# Patient Record
Sex: FEMALE | Race: WHITE | NOT HISPANIC OR LATINO | Employment: FULL TIME | URBAN - METROPOLITAN AREA
[De-identification: names, ages, dates, MRNs, and addresses within clinical notes are randomized per-mention and may not be internally consistent; named-entity substitution may affect disease eponyms.]

---

## 2024-11-21 ENCOUNTER — HOSPITAL ENCOUNTER (OUTPATIENT)
Dept: RADIOLOGY | Facility: CLINIC | Age: 54
Discharge: HOME | End: 2024-11-21
Payer: COMMERCIAL

## 2024-11-21 DIAGNOSIS — Z12.31 SCREENING MAMMOGRAM FOR BREAST CANCER: ICD-10-CM

## 2024-11-21 PROCEDURE — 77063 BREAST TOMOSYNTHESIS BI: CPT | Performed by: RADIOLOGY

## 2024-11-21 PROCEDURE — 77067 SCR MAMMO BI INCL CAD: CPT | Performed by: RADIOLOGY

## 2024-11-21 PROCEDURE — 77067 SCR MAMMO BI INCL CAD: CPT

## 2025-02-03 ENCOUNTER — OFFICE VISIT (OUTPATIENT)
Dept: ORTHOPEDIC SURGERY | Facility: CLINIC | Age: 55
End: 2025-02-03
Payer: COMMERCIAL

## 2025-02-03 ENCOUNTER — HOSPITAL ENCOUNTER (OUTPATIENT)
Dept: RADIOLOGY | Facility: CLINIC | Age: 55
Discharge: HOME | End: 2025-02-03
Payer: COMMERCIAL

## 2025-02-03 DIAGNOSIS — M75.81 ROTATOR CUFF TENDINITIS, RIGHT: ICD-10-CM

## 2025-02-03 DIAGNOSIS — M25.511 ACUTE PAIN OF RIGHT SHOULDER: ICD-10-CM

## 2025-02-03 PROCEDURE — 73030 X-RAY EXAM OF SHOULDER: CPT | Mod: RIGHT SIDE | Performed by: STUDENT IN AN ORGANIZED HEALTH CARE EDUCATION/TRAINING PROGRAM

## 2025-02-03 PROCEDURE — 99204 OFFICE O/P NEW MOD 45 MIN: CPT | Performed by: STUDENT IN AN ORGANIZED HEALTH CARE EDUCATION/TRAINING PROGRAM

## 2025-02-03 PROCEDURE — 99214 OFFICE O/P EST MOD 30 MIN: CPT | Performed by: STUDENT IN AN ORGANIZED HEALTH CARE EDUCATION/TRAINING PROGRAM

## 2025-02-03 PROCEDURE — 73030 X-RAY EXAM OF SHOULDER: CPT | Mod: RT

## 2025-02-03 RX ORDER — NAPROXEN 500 MG/1
500 TABLET ORAL 2 TIMES DAILY
Qty: 28 TABLET | Refills: 0 | Status: SHIPPED | OUTPATIENT
Start: 2025-02-03 | End: 2025-02-17

## 2025-02-03 NOTE — PROGRESS NOTES
Acute Injury New Patient Visit    HPI: Kris is a 54 y.o.female who presents today with new complaints of right shoulder injury.  She is right-hand dominant.  For approximately 4 months she has been having some right shoulder pain that is anterior.  She denies any falls or injuries.  She denies any neck pain and numbness tingling.  She states that reaching behind her makes it worse.  She does not note any popping, clicking, or locking.  She is been trying IcyHot, Tiger balm, Biofreeze.    Plan: For this right rotator cuff tendinitis, we will start her naproxen, physical therapy.  Additionally, discussed conservative treatment measures including rest, ice, elevation, compression, and over-the-counter analgesia as needed and as appropriate.  Risks of NSAID use, steroid use, and muscle relaxers discussed in depth and considered in light of medical comorbidities.  Patient, and parent/guardian as applicable, understand agree with plan.  Follow-up: 5 to 6 weeks  X-rays on follow-up: None      Assessment:   Problem List Items Addressed This Visit    None  Visit Diagnoses       Acute pain of right shoulder        Relevant Medications    naproxen (Naprosyn) 500 mg tablet    Other Relevant Orders    XR shoulder right 2+ views    Referral to Physical Therapy    Rotator cuff tendinitis, right        Relevant Medications    naproxen (Naprosyn) 500 mg tablet    Other Relevant Orders    Referral to Physical Therapy            Diagnostics: Reviewed all relevant imaging including x-ray, MRI, CT, and US.      Procedure:  Procedures    Physical Exam:  GENERAL:  No obvious acute distress.  NEURO:  Distally neurovascularly intact.  Sensation intact to light touch.  Extremity: Right shoulder exam shows:  Skin is intact;  No erythema or warmth;  No edema or ecchymosis;  No clinical signs of infection;  Can forward flex to 180 degrees;  Abduction to 150 degrees;  External rotation from neutral at 75 degrees;  Internal rotation at the  level of T10;  POSITIVE signs of impingement with Guillen or Neer's test;  Negative empty can test;  Negative crossarm test;  No pain over the AC joint;  Negative Natrona's test;  Negative sulcus sign;  Negative anterior apprehension's test; and  Neurovascularly intact.    Orders Placed This Encounter    XR shoulder right 2+ views    Referral to Physical Therapy    naproxen (Naprosyn) 500 mg tablet      At the conclusion of the visit there were no further questions by the patient/family regarding their plan of care.  Patient was instructed to call or return with any issues, questions, or concerns regarding their injury and/or treatment plan described above.     02/03/25 at 12:28 PM - Perez Wagner,     Office: [phone]    This note was prepared using voice recognition software.  The details of this note are correct and have been reviewed, and corrected to the best of my ability.  Some grammatical errors may persist related to the Dragon software.

## 2025-02-05 ENCOUNTER — EVALUATION (OUTPATIENT)
Dept: PHYSICAL THERAPY | Facility: CLINIC | Age: 55
End: 2025-02-05
Payer: COMMERCIAL

## 2025-02-05 DIAGNOSIS — M25.511 ACUTE PAIN OF RIGHT SHOULDER: Primary | ICD-10-CM

## 2025-02-05 DIAGNOSIS — M75.81 ROTATOR CUFF TENDONITIS, RIGHT: ICD-10-CM

## 2025-02-05 PROCEDURE — 97161 PT EVAL LOW COMPLEX 20 MIN: CPT | Mod: GP | Performed by: PHYSICAL THERAPIST

## 2025-02-05 PROCEDURE — 97110 THERAPEUTIC EXERCISES: CPT | Mod: GP | Performed by: PHYSICAL THERAPIST

## 2025-02-05 ASSESSMENT — PATIENT HEALTH QUESTIONNAIRE - PHQ9
SUM OF ALL RESPONSES TO PHQ9 QUESTIONS 1 AND 2: 0
1. LITTLE INTEREST OR PLEASURE IN DOING THINGS: NOT AT ALL
2. FEELING DOWN, DEPRESSED OR HOPELESS: NOT AT ALL

## 2025-02-05 NOTE — PROGRESS NOTES
Physical Therapy Evaluation and Treatment      Patient Name: Kris Dumont  MRN: [id number]  Today's Date: 2/5/2025  Time Calculation  Start Time: 0736  Stop Time: 0817  Time Calculation (min): 41 min      PT Evaluation Time Entry  PT Evaluation (Low) Time Entry: 25  PT Therapeutic Procedures Time Entry  Therapeutic Exercise Time Entry: 15                   INSURANCE:  Visit number: 1  ALEISHA SNYDER COPAY 0 DED 3200( 7 ) 6400( 7 )   COVERAGE 80 OOP 4000( 7 ) 800( 7 ) AUTH REQ# [id number]  1 VISIT 2-5-25 TO 2-19-25 REF# JUSTIN WILKINS [id number]  [id number]/ALL     Referring Provider: Dr. Perez Wagner    ASSESSMENT:  PT Assessment Results: decreased knowledge of HEP, activity limitations, ADLs/IADLs/self care skills, flexibility, motor function/control/tone, pain, participation restrictions, range of motion/joint mobility, strength, posture, transfers, coordination, decreased knowledge of precautions.  Rehab Prognosis: Good  Evaluation/Treatment Tolerance: Patient tolerated treatment well  Stable and/or uncomplicated characteristics    Kris Dumont is a 54 y.o. female presenting to the clinic with s/s consistent with R shoulder impingement and tendonitis of RTC/biceps. Pt demonstrates decreased ROM and strength of the R shoulder causing pain and dysfunction with reaching, lifting, carrying, pushing, pulling, vacuuming, and dressing. Pt was given and reviewed HEP including postural exercises. Pt will benefit from skilled PT in order to increase ROM and strength of the R shoulder so that the pt can perform ADLs without pain and return to PLOF.     PLAN:  Treatments/Interventions: traction, dry needling, edema control, education/instruction, home program, self care/home management, manual therapy, neuromuscular re-education, therapeutic activities, therapeutic exercises, modalities, therapeutic elastic taping.   PT Plan: Skilled PT  PT Frequency: 2 times per week  Duration: 10 visits  Onset Date: 10/05/24  Number of  Treatments Authorized: Requires Authorization  Rehab Potential: Good  Plan of Care Agreement: Patient    Goals -    STG - After about 6 weeks:  Pt will report less than a 4/10 pain at the worst.  Pt will be able to manage changes in intra-abdominal pressure with appropriate thoracic diaphragm, pelvic, and TA muscle activation during functional activities that cause symptoms.  Pt will increase by 1 MMT grade for the R shoulder in order to aid with completion of ADLs.  Pt will report MDIC with Quick DASH score.  Pt will have AROM by 10 degrees for the R shoulder.     LTG - after about 12 weeks:  Pt will report less than a 0-2/10 pain at the worst.  Pt will have at least 4+/5 to 5/5 strength for the R shoulder in order to aid with completion of ADLs.  Pt will have AROM to WFL for the R shoulder.   Pt will report 50% improvement with Quick DASH score.  Pt will be independent with progressed HEP.    CURRENT PROBLEM:   1. Acute pain of right shoulder  Follow Up In Physical Therapy      2. Rotator cuff tendonitis, right  Follow Up In Physical Therapy          Subjective    General -    Pt reports that her R shoulder pain started about 4 months ago. Pt does not know how it started but reports that she noticed it when she reached out at an angle. Pt states that it would be off and on for a little bit and she was able to manage the pain at first with different creams. Pt reports that about a month ago she started to notice a lot more weakness in the shoulder, especially with reaching to the side.     Mechanism of Injury -    Insidious Onset    History of Current Episode - 4 months    Pain -    Pain Location: R shoulder  Pain Best: 0-1/10  Pain Today: 1/10  Pain Worst: 8/10   Pain Type: Intermittent, Achy/Dull, Sharp, Stiffness/Tightness    Pain Exacerbating Factors: reaching, lifting, carrying, pushing, pulling, vacuuming, and dressing.  Pain Relieving Factors: Rest, Ibuprofen, Tiger Balm, Icy Hot, Hot water  bottle  Difficulty Sleeping: Sometimes, when she rolls  Night Sweats, Loss of Appetite, Unexpected Weight-loss: No  Saddle/Bowel/Bladder: No    Work -   Work Status: Full Time   Office Job    Home Living -    Pt lives with her .    Patient Stated Goals -    Strengthen her arm  Reduce pain    PRECAUTIONS -    None    Prior Level of Function -    I with ADLs/IADLs     Objective     Shoulder AROM -  R shoulder Flexion: 145   R shoulder Abduction: 65   R shoulder ER: 58   R shoulder Functional IR reach: T7   L shoulder Flexion: 173   L shoulder Abduction: 155   L shoulder ER: 68   L shoulder Functional IR reach: T10     Shoulder MMT (/5) -  R shoulder Flexion: 4-   R shoulder Abduction: 4-   R shoulder ER: 4-   R shoulder IR: 4+   L shoulder Flexion: 4+   L shoulder Abduction: 4+   L shoulder ER: 4   L shoulder IR: 4+     Special Tests -   Neer Impingement negative bilateral  Hawkin's Kyle positive right  Mouna negative bilateral  Empty Can negative bilateral  Speed's positive bilateral    Posture -   Elevated Scapula  Rounded Shoulders  Forward Head    Outcome Measures -   Other Measures  Disability of Arm Shoulder Hand (DASH): 34 (% Quick)     Treatment -   Evaluation -   Low (89674)  Therapeutic Exercise (79922) -     Seated Correct Posture: reviewed  Scapular Retractions: x10  Cervical Retractions: x10  UT/Levator Scapulae Stretch: 30 sec ea B  Towel Slides at Table Top (Flexion/Scaption): x10 ea   T-band B ER: GTB x10  T-band Rows: GTB x10    OP Patient Education -   Access Code: [email or web address]  URL: [email or web address]  Date: 02/05/2025  Prepared by: Janet James    Exercises  - Seated Correct Posture   - Seated Scapular Retraction  - 1-2 x daily - 2 sets - 10 reps - 2 seconds hold  - Seated Cervical Retraction  - 1-2 x daily - 2 sets - 10 reps - 2 seconds hold  - Seated Upper Trapezius Stretch  - 1-2 x daily - 2-3 sets - 30 seconds hold  - Gentle Levator Scapulae Stretch  - 1-2 x daily  - 2-3 sets - 30 seconds hold  - Seated Bilateral Shoulder Flexion Towel Slide at Table Top  - 1-2 x daily - 2-3 sets - 10 reps  - Seated Shoulder Towel Slides Scaption at Table  - 1-2 x daily - 2-3 sets - 10 reps  - Shoulder External Rotation and Scapular Retraction with Resistance  - 1-2 x daily - 2-3 sets - 10 reps  - Standing Row with Anchored Resistance  - 1-2 x daily - 2-3 sets - 10 reps

## 2025-02-19 ENCOUNTER — TREATMENT (OUTPATIENT)
Dept: PHYSICAL THERAPY | Facility: CLINIC | Age: 55
End: 2025-02-19
Payer: COMMERCIAL

## 2025-02-19 DIAGNOSIS — M75.81 ROTATOR CUFF TENDONITIS, RIGHT: ICD-10-CM

## 2025-02-19 DIAGNOSIS — M25.511 ACUTE PAIN OF RIGHT SHOULDER: ICD-10-CM

## 2025-02-19 PROCEDURE — 97110 THERAPEUTIC EXERCISES: CPT | Mod: GP,CQ

## 2025-02-19 ASSESSMENT — PAIN SCALES - GENERAL: PAINLEVEL_OUTOF10: 2

## 2025-02-19 ASSESSMENT — PAIN - FUNCTIONAL ASSESSMENT: PAIN_FUNCTIONAL_ASSESSMENT: 0-10

## 2025-02-19 NOTE — PROGRESS NOTES
"Physical Therapy Treatment    Patient Name: Kris Dumont  MRN: [id number]  Today's Date: 2/19/2025  Time Calculation  Start Time: 0747  Stop Time: 0830  Time Calculation (min): 43 min    Insurance  Visit number: Abdirizak SNYDER COPAY 0 DED 3200( 7 ) 6400( 7 )   COVERAGE 80 OOP 4000( 7 ) 800( 7 ) AUTH REQ# [id number]  1 VISIT 2-5-25 TO 2-19-25 REF# JUSTIN WILKINS [id number]  [id number]/ALL      Referring Provider: Dr. Perez Wagner     Current Problem  1. Acute pain of right shoulder  Follow Up In Physical Therapy      2. Rotator cuff tendonitis, right  Follow Up In Physical Therapy          Subjective    General   Pt states she \"was okay\" following initial visit.  She has been using the band with HEP activities & does get some increased soreness following the activities, but otherwise doing well with HEP.  Reports performing 2x/day.  Overall, pt states is still \"pretty much the same\" and \"I try not to baby it.\"  Reports having the most difficulty with reaching movements.     Precautions  Precautions  Precautions Comment: No recent falls reported    Pain  Pain Assessment  Pain Assessment: 0-10  0-10 (Numeric) Pain Score: 2  Pain Location: Shoulder  Pain Orientation: Right  Pain Radiating Towards: down into elbow with reaching movements  Pain Descriptors: Nagging, Dull, Throbbing  Pain Frequency: Intermittent  Clinical Progression: Not changed  Effect of Pain on Daily Activities: Reaching, Lifting, Carrying, Sleep    Objective   Pt denies any numbness, tingling    Treatments:  Therapeutic Exercises (16862): 41 Minutes, 3 Units    Activities:  Seated Correct Posture: reviewed  Scapular Retractions: 5\" x15  Cervical Retractions: 5\" x15  UT/Levator Scapulae Stretch: 30 sec x2 ea LUC  Towel Slides at Table Top (Flexion/Scaption): x10 ea   T-band B ER: GTB x10  T-band Rows: GTB x10  T-band LAE w/scap retraction: GTB x10       Assessment:   Reviewed activities initiated first visit, providing vc's as needed for postural " "improvements & overall form/technique.  She exhibits good recall of the initial activities, suggesting good compliance so far with HEP.  Good follow through observed to any cues provided.  Progressed table slide to wall this visit with good tolerance.  Also added wall ABC's to improve overall mobility & capacity for reaching into various directions.  TB LAE w/scap retraction also added to the activities performed for further strengthening.  Reports shoulder \"feels better\" with rehab activities performed.  Normal muscle soreness, fatigue reported post activity.  Anticipate she will be able to continue with POC & progressions as tolerated.    Plan:   Continue to progress with rehab POC as tolerated to increase ROM/mobility & GH/scapular strength, stability for improving overall capacity to perform daily activities.     "

## 2025-02-21 ENCOUNTER — TREATMENT (OUTPATIENT)
Dept: PHYSICAL THERAPY | Facility: CLINIC | Age: 55
End: 2025-02-21
Payer: COMMERCIAL

## 2025-02-21 DIAGNOSIS — M25.511 ACUTE PAIN OF RIGHT SHOULDER: ICD-10-CM

## 2025-02-21 DIAGNOSIS — M75.81 ROTATOR CUFF TENDONITIS, RIGHT: ICD-10-CM

## 2025-02-21 PROCEDURE — 97110 THERAPEUTIC EXERCISES: CPT | Mod: GP | Performed by: PHYSICAL THERAPIST

## 2025-02-21 NOTE — PROGRESS NOTES
Physical Therapy Treatment    Patient Name: Kris Dumont  MRN: [id number]  Today's Date: 2/21/2025  Time Calculation  Start Time: 0737  Stop Time: 0816  Time Calculation (min): 39 min       PT Therapeutic Procedures Time Entry  Therapeutic Exercise Time Entry: 38                   INSURANCE:  Visit number: 3/6  ANTHEM APPROVED 5 PT VISITS  2-10-25 to 4-10-25  AUTH# [id number]  [id number]/ALL  ANTHEM BOA COPAY 0 DED 3200( 7 ) 6400( 7 )   COVERAGE 80 OOP 4000( 7 ) 800( 7 ) AUTH REQ# [id number]  1 VISIT 2-5-25 TO 2-19-25 REF# JUSTIN WILKINS [id number]  [id number]/ALL     Referring Provider: Dr. Perez Wagner     CURRENT PROBLEM:  1. Acute pain of right shoulder  Follow Up In Physical Therapy      2. Rotator cuff tendonitis, right  Follow Up In Physical Therapy        SUBJECTIVE:   General -   Pt is doing home exercises.    Pain -    Pain Location/Description: R shoulder  Pain Today: 0/10  Pain Worst: 10/10 reaching behind her    Precautions -    None    OBJECTIVE:   Treatment -   Therapeutic Exercise (26499) -  Chin Tucks: x15 (supine)  Side-lying Shoulder 4-way: 2x10  Supine SA Punches: 2x10  Towel Slides at Wall (Flexion/Scaption): x10 ea   T-band Rows/LAE w/scap retraction: GTB 2x10  T-band B ER at wall: GTB 2x10  SA Slides: 2x10  Wall Push Ups: 2x10  T-band IR/ER: GTB 2x10 ea  MB Series (CP, Bus, 8s): 1.1# 2x10 ea  Wall Alphabet: --    OP Patient Education -   Access Code: [email or web address]  URL: [email or web address]  Date: 02/21/2025  Prepared by: Janet James  Added Exercises:  - Sidelying Shoulder External Rotation with Dumbbell  - 1-2 x daily - 2-3 sets - 10 reps  - Sidelying Shoulder Abduction Palm Forward  - 1-2 x daily - 2-3 sets - 10 reps  - Sidelying Shoulder Flexion 15 Degrees  - 1-2 x daily - 2-3 sets - 10 reps  - Sidelying Shoulder Horizontal Abduction  - 1-2 x daily - 2-3 sets - 10 reps    ASSESSMENT:     Pt was introduced to SA activation exercises which she responded well. Added side-lying  4-way for improved GH joint stability; cueing provided to prevent scapular hiking. Pt was introduced to wall pushups for improved closed chained strength. Added MB series for improved addition of rotational strength and combined motions. Pt tolerated session well and is progressing towards functional needs. Continue to progress pt within tolerance and POC.    PLAN:    Continue to progress pt within tolerance. Continue to work on GH stability with scapular posture.

## 2025-02-25 ENCOUNTER — TREATMENT (OUTPATIENT)
Dept: PHYSICAL THERAPY | Facility: CLINIC | Age: 55
End: 2025-02-25
Payer: COMMERCIAL

## 2025-02-25 DIAGNOSIS — M75.81 ROTATOR CUFF TENDONITIS, RIGHT: ICD-10-CM

## 2025-02-25 DIAGNOSIS — M25.511 ACUTE PAIN OF RIGHT SHOULDER: ICD-10-CM

## 2025-02-25 PROCEDURE — 97110 THERAPEUTIC EXERCISES: CPT | Mod: GP

## 2025-02-25 NOTE — PROGRESS NOTES
Physical Therapy Treatment    Patient Name: Kris Dumont  MRN: [id number]  Today's Date: 2/25/2025  Time Calculation  Start Time: 0746  Stop Time: 0825  Time Calculation (min): 39 min       PT Therapeutic Procedures Time Entry  Therapeutic Exercise Time Entry: 39                   INSURANCE:  Visit number: 4/6  ANTHEM APPROVED 5 PT VISITS  2-10-25 to 4-10-25  AUTH# [id number]  [id number]/ALL  ANTHEM BOA COPAY 0 DED 3200( 7 ) 6400( 7 )   COVERAGE 80 OOP 4000( 7 ) 800( 7 ) AUTH REQ# [id number]  1 VISIT 2-5-25 TO 2-19-25 REF# JUSTIN WILKINS [id number]  [id number]/ALL     Referring Provider: Dr. Perez Wagner     CURRENT PROBLEM:  1. Acute pain of right shoulder  Follow Up In Physical Therapy      2. Rotator cuff tendonitis, right  Follow Up In Physical Therapy        SUBJECTIVE:   General -   Pt states her shoulder had some pain this morning after sleeping on it but the pain has since subsided. She reports compliance with HEP.     Pain -    Pain Location/Description: R shoulder  Pain Today: 0/10  Pain Worst: 10/10 reaching behind her    Precautions -    None    OBJECTIVE:   Treatment -   Therapeutic Exercise (69990) -  Chin Tucks: x15 (supine)  Side-lying Shoulder 4-way: 2x10  Supine SA Punches: 2x10  Towel Slides at Wall (Flexion/Scaption): x10 ea   T-band Rows/LAE w/scap retraction: GTB 2x10  T-band IR/ER: GTB 2x10 ea  T-band B ER at wall: GTB 2x10  SA w/ RTB Slides: 2x10  Wall Push Ups: 2x10  MB Series (CP, Bus, 8s): 1.1# 2x10 ea  Wall Alphabet: --    OP Patient Education -   Access Code: [email or web address]  URL: [email or web address]  Date: 02/25/2025  Prepared by: Caterina Aleman    Exercises  - Standing Shoulder Row with Anchored Resistance  - 1 x daily - 4 x weekly - 2 sets - 10 reps  - Shoulder extension with resistance - Neutral  - 1 x daily - 4 x weekly - 2 sets - 10 reps  - Shoulder External Rotation and Scapular Retraction with Resistance  - 1 x daily - 4 x weekly - 2 sets - 10 reps    ASSESSMENT:   Pt  tolerated treatment session well today. Added RTB for ER engagement during SA wall slides to good tolerance. Pt noted increased difficulty with this exercise. Attempted standing banded shoulder Horizontal abduction but pt experienced increased biceps tendon pain, so exercise was stopped after first few repetitions. Educated and provided updated pt HEP. Pt continues to require skilled PT to increase strength and ROM to return to PLOF.     PLAN:    Continue to progress as tolerated to increase strength and ROM

## 2025-02-28 ENCOUNTER — TREATMENT (OUTPATIENT)
Dept: PHYSICAL THERAPY | Facility: CLINIC | Age: 55
End: 2025-02-28
Payer: COMMERCIAL

## 2025-02-28 DIAGNOSIS — M75.81 ROTATOR CUFF TENDONITIS, RIGHT: ICD-10-CM

## 2025-02-28 DIAGNOSIS — M25.511 ACUTE PAIN OF RIGHT SHOULDER: ICD-10-CM

## 2025-02-28 PROCEDURE — 97110 THERAPEUTIC EXERCISES: CPT | Mod: GP | Performed by: PHYSICAL THERAPIST

## 2025-02-28 NOTE — PROGRESS NOTES
Physical Therapy Treatment    Patient Name: Kris Dumont  MRN: [id number]  Today's Date: 2/28/2025  Time Calculation  Start Time: 1031  Stop Time: 1111  Time Calculation (min): 40 min       PT Therapeutic Procedures Time Entry  Therapeutic Exercise Time Entry: 38                   INSURANCE:  Visit number: 5/6  ANTHEM APPROVED 5 PT VISITS  2-10-25 to 4-10-25  AUTH# [id number]  [id number]/ALL  ANTHEM BOA COPAY 0 DED 3200( 7 ) 6400( 7 )   COVERAGE 80 OOP 4000( 7 ) 800( 7 ) AUTH REQ# [id number]  1 VISIT 2-5-25 TO 2-19-25 REF# JUSTIN WILKINS [id number]  [id number]/ALL      Referring Provider: Dr. Perez Wagner     CURRENT PROBLEM:  1. Acute pain of right shoulder  Follow Up In Physical Therapy      2. Rotator cuff tendonitis, right  Follow Up In Physical Therapy        SUBJECTIVE:   General -   Pt is doing home exercises.  Tightness soreness     Pain -    Pain Location/Description: R shoulder  Pain Today: 0-1/10    Precautions -    None    OBJECTIVE:     Treatment -   Therapeutic Exercise (90534) -  Chin Tucks: x15 (supine)  Side-lying Shoulder 4-way: 1# 2x10  Supine SA Punches: 2x10  T-band Rows/LAE w/scap retraction: BTB 2x10  T-band IR/ER: BTB 2x10 ea  SA w/ RTB Slides: 2x10  SA walkout: RTB 2x10  Wall Push Ups Plus: 2x10  Ball on Wall: 1.1# x20 ea  MB Series (CP, Bus, 8s): 1.1# 2x10 ea  Towel Slides at Wall (Flexion/Scaption): x20 ea   JOBEs at wall: 2x10  T-band B ER at wall: GTB 2x15  Wall Alphabet: --    ASSESSMENT:     Pt was able to progress resistance for side-lying 4-way and t-band exercises with good tolerance. Added SA push to wall push ups for improved scapular control. Pt was introduced to ball on wall and JOBEs on wall for improved GH stability with cueing for posture. Pt tolerated session well and is progressing towards functional needs. Continue to progress pt within tolerance and POC.    PLAN:    Continue to progress pt within tolerance. Plan to re-assess pt next visit.

## 2025-03-04 ENCOUNTER — TREATMENT (OUTPATIENT)
Dept: PHYSICAL THERAPY | Facility: CLINIC | Age: 55
End: 2025-03-04
Payer: COMMERCIAL

## 2025-03-04 DIAGNOSIS — M75.81 ROTATOR CUFF TENDONITIS, RIGHT: ICD-10-CM

## 2025-03-04 DIAGNOSIS — M25.511 ACUTE PAIN OF RIGHT SHOULDER: Primary | ICD-10-CM

## 2025-03-04 PROCEDURE — 97110 THERAPEUTIC EXERCISES: CPT | Mod: GP | Performed by: PHYSICAL THERAPIST

## 2025-03-04 PROCEDURE — 97032 APPL MODALITY 1+ESTIM EA 15: CPT | Mod: GP | Performed by: PHYSICAL THERAPIST

## 2025-03-04 PROCEDURE — 97140 MANUAL THERAPY 1/> REGIONS: CPT | Mod: GP | Performed by: PHYSICAL THERAPIST

## 2025-03-04 NOTE — PROGRESS NOTES
Physical Therapy Treatment    Patient Name: Kris Dumont  MRN: [id number]  Today's Date: 3/4/2025  Time Calculation  Start Time: 0736  Stop Time: 0817  Time Calculation (min): 41 min       PT Therapeutic Procedures Time Entry  Manual Therapy Time Entry: 15  Therapeutic Exercise Time Entry: 15  PT Modalities Time Entry  E-Stim (Attended) Time Entry: 8                INSURANCE:  Visit number: 5/6  ANTHEM APPROVED 5 PT VISITS  2-10-25 to 4-10-25  AUTH# [id number]  [id number]/ALL  ANTHEM BOA COPAY 0 DED 3200( 7 ) 6400( 7 )   COVERAGE 80 OOP 4000( 7 ) 800( 7 ) AUTH REQ# [id number]  1 VISIT 2-5-25 TO 2-19-25 REF# JUSTIN WILKINS [id number]  [id number]/ALL     Referring Provider: Dr. Perez Wagner     CURRENT PROBLEM:  1. Acute pain of right shoulder        2. Rotator cuff tendonitis, right          SUBJECTIVE:   General -   Pt is doing home exercises.  Pt states that she was improving, but this past week she has been having more pain.    Pain -    Pain Location/Description: R shoulder  Pain Best: 1/10  Pain Today: 4/10  Pain Worst: 4/10     Precautions -    None    OBJECTIVE:     Shoulder AROM -  R shoulder Flexion: 140   R shoulder Abduction: 75  R shoulder ER: 60  R shoulder Functional IR reach: T10     Shoulder MMT (/5) -  R shoulder Flexion: 4- with pain  R shoulder Abduction: 4- with pain  R shoulder ER: 4  R shoulder IR: 4+      Special Tests -   Neer Impingement negative bilateral  Hawkin's Kyle positive right  Mouna negative bilateral  Empty Can negative bilateral  Speed's positive bilateral     Posture -   Elevated Scapula  Rounded Shoulders  Forward Head    Outcome Measures -   Other Measures  Disability of Arm Shoulder Hand (DASH): 18 (% Quick)    Verbal Informed Consent Given for Integrative Dry Needling -   Suprascapular Homeostatic Neuro-trigger Point (1, 50 mm) R  Deltoid Insertion Symptomatic Neuro-trigger Points (1 ea, 50 mm) R  Spinal Accessory Homeostatic Neuro-trigger Point (1, 30 mm) ea B  Dorsal Scapular  Homeostatic Neuro-trigger Point (1, 30 mm) ea B  Lateral Pectoral Homeostatic Neuro-trigger Point (1, 30 mm) R  ENS applied between R Suprascapular/deltoid points for 8 min for improved blood flow and reduced pain/spasm.      Treatment -   Therapeutic Exercise (82524) -  Assessment  Wall Slides (Flexion/Scaption): 2x10 ea  Doorway Pectoral Stretch: 30 sec x2  Manual Therapy (47864) -    STM to posterior and anterior shoulder musculature    ASSESSMENT:     Informed consent for dry needling obtained verbally from patient and reviewed precautions/contraindications. Dry needling performed this date to homeostatic neuro-trigger points and symptomatic neuro-trigger points documented. Clean field assessed before insertion of needles using universal precautions. 30-50 mm needles inserted deep with basic/rotation needle manipulation. Electric stimulation applied for further management of soft tissue dysfunction for 8 minutes. All needles removed, area inspected for adverse symptoms with none noted. Patient educated in post- dry needling management and expected symptoms from treatment. All patient questions answered.   Pt tolerated session well and is progressing towards functional needs. Continue to progress pt within tolerance and POC.    Pt is progressing towards her goals and demonstrates small improvements with her R shoulder for both ROM and strength at this time. Pt had a slight set back this past week, but had been improving previously. Pt reports an increased ability to perform ADLs, but still exhibits deficits as shown by her Quick DASH. Pt will benefit from continued skilled PT in order to further improve ROM and strength of the R shoulder so that the pt can perform ADLs without pain and return to PLOF. Pt will be put on hold for authorization.    PLAN:  Treatments/Interventions: traction, dry needling, edema control, education/instruction, home program, self care/home management, manual therapy, neuromuscular  re-education, therapeutic activities, therapeutic exercises, modalities, therapeutic elastic taping.   PT Plan: Skilled PT  PT Frequency: 1 time per week  Duration: 6 visits  Number of Treatments Authorized: Requires Further Authorization  Rehab Potential: Good  Plan of Care Agreement: Patient  Hold for authorization.    Goals -    STG - After about 6 weeks:  Pt will report less than a 4/10 pain at the worst. (Goal PROGRESSING)    Pt will increase by 1 MMT grade for the R shoulder in order to aid with completion of ADLs. (Goal PROGRESSING)    Pt will report MDIC with Quick DASH score. (Goal PROGRESSING)    Pt will have AROM by 10 degrees for the R shoulder. (Goal Not Met)       LTG - after about 12 weeks:  Pt will report less than a 0-2/10 pain at the worst.  Pt will have at least 4+/5 to 5/5 strength for the R shoulder in order to aid with completion of ADLs.  Pt will have AROM to WFL for the R shoulder.   Pt will report 50% improvement with Quick DASH score.  Pt will be independent with progressed HEP.

## 2025-03-13 ENCOUNTER — OFFICE VISIT (OUTPATIENT)
Dept: ORTHOPEDIC SURGERY | Facility: CLINIC | Age: 55
End: 2025-03-13
Payer: COMMERCIAL

## 2025-03-13 DIAGNOSIS — M75.81 ROTATOR CUFF TENDINITIS, RIGHT: ICD-10-CM

## 2025-03-13 DIAGNOSIS — M75.01 ADHESIVE CAPSULITIS OF RIGHT SHOULDER: ICD-10-CM

## 2025-03-13 PROCEDURE — 99213 OFFICE O/P EST LOW 20 MIN: CPT | Performed by: STUDENT IN AN ORGANIZED HEALTH CARE EDUCATION/TRAINING PROGRAM

## 2025-03-13 NOTE — PROGRESS NOTES
Acute Injury New Patient Visit    HPI: Kris is a 54 y.o.female who presents today for follow-up of her right rotator cuff tendinitis.  States that she is better overall.  She had done physical therapy.  She states that she is feeling a stiffness.  She is having difficulty reaching behind.  She tried dry needling, which helped with some anterior shoulder pain, thought to be biceps tendinitis.  She has been doing her home exercises.  Reaching behind her still hurts.  Heat seems to help.  Denies any interval falls or injuries    On 2/3/2025, she presented with new complaints of right shoulder injury.  She is right-hand dominant.  For approximately 4 months she has been having some right shoulder pain that is anterior.  She denies any falls or injuries.  She denies any neck pain and numbness tingling.  She states that reaching behind her makes it worse.  She does not note any popping, clicking, or locking.  She is been trying IcyHot, Tiger balm, Biofreeze.    Plan: Today, on 3/13/2025, for this right rotator cuff tendinitis and possible developing right adhesive capsulitis, will continue physical therapy, home exercises, and obtain an MRI of the right shoulder for further characterization of what may be a partial rotator cuff tear due to her loss of range of motion in abduction and forward flexion.  Would consider a corticosteroid injection at follow-up, but decide between glenohumeral versus subacromial if we decide this is more related to rotator cuff versus adhesive capsulitis.    On 2/3/2025, for this right rotator cuff tendinitis, we will start her naproxen, physical therapy.  Additionally, discussed conservative treatment measures including rest, ice, elevation, compression, and over-the-counter analgesia as needed and as appropriate.  Risks of NSAID use, steroid use, and muscle relaxers discussed in depth and considered in light of medical comorbidities.  Patient, and parent/guardian as applicable, understand  agree with plan.  Follow-up: With me with results of the MRI  X-rays on follow-up: None      Assessment:   Problem List Items Addressed This Visit    None  Visit Diagnoses       Rotator cuff tendinitis, right        Relevant Orders    MR shoulder right wo IV contrast    Referral to Physical Therapy    Adhesive capsulitis of right shoulder        Relevant Orders    MR shoulder right wo IV contrast    Referral to Physical Therapy              Diagnostics: Reviewed all relevant imaging including x-ray, MRI, CT, and US.      Procedure:  Procedures    Physical Exam:  GENERAL:  No obvious acute distress.  NEURO:  Distally neurovascularly intact.  Sensation intact to light touch.  Extremity: Right shoulder exam shows:  Skin is intact;  No erythema or warmth;  No edema or ecchymosis;  No clinical signs of infection;  Can forward flex to 110 degrees;  Abduction to 90 degrees;  External rotation from neutral at 75 degrees;  Internal rotation at the level of T10;  POSITIVE signs of impingement with Guillen or Neer's test;  Negative empty can test;  Negative crossarm test;  No pain over the AC joint;  Negative [place]'s test;  Negative sulcus sign;  Negative anterior apprehension's test; and  Neurovascularly intact.    Orders Placed This Encounter    MR shoulder right wo IV contrast    Referral to Physical Therapy      At the conclusion of the visit there were no further questions by the patient/family regarding their plan of care.  Patient was instructed to call or return with any issues, questions, or concerns regarding their injury and/or treatment plan described above.     03/13/25 at 6:16 PM - Perez Wagner DO    Office: [phone]    This note was prepared using voice recognition software.  The details of this note are correct and have been reviewed, and corrected to the best of my ability.  Some grammatical errors may persist related to the Dragon software.

## 2025-03-14 ENCOUNTER — TELEPHONE (OUTPATIENT)
Dept: PHYSICAL THERAPY | Facility: CLINIC | Age: 55
End: 2025-03-14

## 2025-03-24 NOTE — PROGRESS NOTES
Physical Therapy Treatment    Patient Name: Kris Dumont  MRN: [id number]  Today's Date: 3/25/2025  Time Calculation  Start Time: 0507  Stop Time: 0548  Time Calculation (min): 41 min     PT Therapeutic Procedures Time Entry  Manual Therapy Time Entry: 13  Therapeutic Exercise Time Entry: 26                 Current Problem  1. Acute pain of right shoulder  Follow Up In Physical Therapy      2. Rotator cuff tendonitis, right  Follow Up In Physical Therapy          Insurance:  Visit number: 6/12  ANTHEM APPROVED 5 PT VISITS  2-10-25 to 4-10-25  AUTH# [id number]  [id number]  ANTHEM BOA COPAY 0 DED 3200( 7 ) 6400( 7 )   COVERAGE 80 OOP 4000( 7 ) 800( 7 ) AUTH REQ# [id number]  1 VISIT 2-5-25 TO 2-19-25 REF# JUSTIN WILKINS [id number]  [id number]/ALL  Precautions   None     Subjective   Subjective:   Pt reports that her pain is more in the shoulder vs the upper arm, as it used to be. She still can't reach behind the back. She had a lot of pain when trying to put something on a shelf. She saw MD recently who thinks she may have a partial tear, thus MRI scheduled for April. Uncomfortable pain is always there. The dry needling seemed to help loosen her a little, but then it returned. Pt hasn't done many exercises since last therapy session  Pain   Dull throb at rest and can go to 10/10    Objective   Treatments:  Therapeutic Exercise (36467) -    Chin Tucks: x15 (supine)   Side-lying Shoulder 4-way: 1# 2x10----  Supine SA Punches: 2x10  Supine alphabet 1x  T-band Rows/LAE w/scap retraction: BTB 2x10  T-band IR/ER: BTB 2x10 ea  SA w/ RTB Slides: 2x10 (no band today)  SA walkout: RTB 2x10--  Wall Push Ups Plus: 2x10---  Ball on Wall: 1.1# x20 ea---  MB Series (CP, Bus, 8s): 1.1# 2x10 ea---  Towel Slides at Wall (Flexion/Scaption): x20 ea ---  JOBEs at wall: 2x10---  T-band B ER at wall: GTB 2x15---  Wall Alphabet: --  Manual Therapy (03910) -    STM to posterior and anterior shoulder musculature, PROM  OP EDUCATION:   Keep doing  gentle ROM ex and strengthening that doesn't cause increased shoulder pain.      Assessment:   Pt had some point tenderness in the anterior shoulder. Tolerated PROM and STM fairly well. Able to do supine exercises without a lot of pain. Pt fatigued with TB ER/IR. Good form with rows and ext. Able to do SA slides, but wall slides in scaption were painful and difficult.     Plan:   Cont to improve shoulder stability.

## 2025-03-25 ENCOUNTER — TREATMENT (OUTPATIENT)
Dept: PHYSICAL THERAPY | Facility: CLINIC | Age: 55
End: 2025-03-25
Payer: COMMERCIAL

## 2025-03-25 DIAGNOSIS — M75.81 ROTATOR CUFF TENDONITIS, RIGHT: ICD-10-CM

## 2025-03-25 DIAGNOSIS — M25.511 ACUTE PAIN OF RIGHT SHOULDER: Primary | ICD-10-CM

## 2025-03-25 PROCEDURE — 97110 THERAPEUTIC EXERCISES: CPT | Mod: GP,CQ

## 2025-03-25 PROCEDURE — 97140 MANUAL THERAPY 1/> REGIONS: CPT | Mod: GP,CQ

## 2025-04-08 ENCOUNTER — TREATMENT (OUTPATIENT)
Dept: PHYSICAL THERAPY | Facility: CLINIC | Age: 55
End: 2025-04-08
Payer: COMMERCIAL

## 2025-04-08 DIAGNOSIS — M75.81 ROTATOR CUFF TENDONITIS, RIGHT: ICD-10-CM

## 2025-04-08 DIAGNOSIS — M25.511 ACUTE PAIN OF RIGHT SHOULDER: ICD-10-CM

## 2025-04-08 PROCEDURE — 97140 MANUAL THERAPY 1/> REGIONS: CPT | Mod: GP | Performed by: PHYSICAL THERAPIST

## 2025-04-08 PROCEDURE — 97110 THERAPEUTIC EXERCISES: CPT | Mod: GP | Performed by: PHYSICAL THERAPIST

## 2025-04-08 NOTE — PROGRESS NOTES
Physical Therapy Treatment    Patient Name: Kris Dumont  MRN: [id number]  Today's Date: 4/8/2025  Time Calculation  Start Time: 0737  Stop Time: 0817  Time Calculation (min): 40 min       PT Therapeutic Procedures Time Entry  Manual Therapy Time Entry: 23  Therapeutic Exercise Time Entry: 15                   INSURANCE:  Visit number: 7/12  ANTHEM APPROVED 5 PT VISITS  2-10-25 to 4-10-25  AUTH# [id number]  [id number]/ALL  ALEISHA BOA COPAY 0 DED 3200( 7 ) 6400( 7 )   COVERAGE 80 OOP 4000( 7 ) 800( 7 ) AUTH REQ# [id number]  1 VISIT 2-5-25 TO 2-19-25 REF# JUSTIN WILKINS [id number]  [id number]/ALL    CURRENT PROBLEM:  1. Acute pain of right shoulder  Follow Up In Physical Therapy      2. Rotator cuff tendonitis, right  Follow Up In Physical Therapy        SUBJECTIVE:   General -   Pt is doing home exercises.  R shoulder is stiffening up still  Stretching from last therapist really helped    Pain -    Pain Location/Description: R shoulder  Pain Today: 2/10    Precautions -    None    OBJECTIVE:     Treatment -   Therapeutic Exercise (53371) -  Side Sleeper Stretch: 10 sec x10  ER stretch at Table: 10 sec x10  T-band Rows/LAE w/scap retraction: GTB 2x10    Manual Therapy (24447) -    STM to posterior and anterior shoulder musculature  PROM   Inferior and PA joint mobilizations    OP Patient Education -   Access Code: [email or web address]  URL: [email or web address]  Date: 04/08/2025  Prepared by: Janet James  Added Exercises  - Sleeper Stretch  - 1-2 x daily - 10 reps - 10 sec hold  - Seated Shoulder External Rotation PROM on Table  - 1-2 x daily - 10 reps - 10 sec hold    HEP2Go  - Inferior self mobs in flexion on wall and abduction at table/countertop  - Inferior Glide/Distraction in chair    ASSESSMENT:     STM and joint mobs performed with PROM this visit for reduced pain and improved ROM. Pt was introduced to more capsular stretching at this time. Pt was given an updated HEP for home with new stretches. Pt  tolerated session well and is progressing towards functional needs. Continue to progress pt within tolerance and POC.    PLAN:    Continue to progress pt within tolerance. Continue with stretching of the R shoulder.

## 2025-04-10 ENCOUNTER — APPOINTMENT (OUTPATIENT)
Dept: RADIOLOGY | Facility: HOSPITAL | Age: 55
End: 2025-04-10
Payer: COMMERCIAL

## 2025-04-15 ENCOUNTER — TREATMENT (OUTPATIENT)
Dept: PHYSICAL THERAPY | Facility: CLINIC | Age: 55
End: 2025-04-15
Payer: COMMERCIAL

## 2025-04-15 DIAGNOSIS — M25.511 ACUTE PAIN OF RIGHT SHOULDER: ICD-10-CM

## 2025-04-15 DIAGNOSIS — M75.81 ROTATOR CUFF TENDONITIS, RIGHT: ICD-10-CM

## 2025-04-15 PROCEDURE — 97140 MANUAL THERAPY 1/> REGIONS: CPT | Mod: GP | Performed by: PHYSICAL THERAPIST

## 2025-04-15 NOTE — PROGRESS NOTES
Physical Therapy Treatment    Patient Name: Kris Dumont  MRN: [id number]  Today's Date: 4/15/2025  Time Calculation  Start Time: 0738  Stop Time: 0803  Time Calculation (min): 25 min       PT Therapeutic Procedures Time Entry  Manual Therapy Time Entry: 23                   INSURANCE:  Visit number: 8/12  ANTHEM APPROVED 5 PT VISITS  2-10-25 to 4-10-25  AUTH# [id number]  [id number]/ALL  ANTHEM BOA COPAY 0 DED 3200( 7 ) 6400( 7 )   COVERAGE 80 OOP 4000( 7 ) 800( 7 ) AUTH REQ# [id number]  1 VISIT 2-5-25 TO 2-19-25 REF# JUSTIN WILKINS [id number]  [id number]/ALL    CURRENT PROBLEM:  1. Acute pain of right shoulder  Follow Up In Physical Therapy      2. Rotator cuff tendonitis, right  Follow Up In Physical Therapy        SUBJECTIVE:   General -   Pt is doing home exercises.  Pt had an MRI which showed adhesive capsulitis, partial tear of supraspinatus, and SLAP tear into the long head of the biceps.     Pain -    Pain Location/Description: R shoulder  Pain Today: 2/10    Precautions -    None    OBJECTIVE:     Shoulder AROM -  R shoulder Flexion: 90   R shoulder Abduction: 75   R shoulder ER: 20   R shoulder Functional IR reach: R iliac crest     Shoulder PROM -  R shoulder Flexion: 108 with empty end feel   R shoulder Abduction: 80 with empty end feel   R shoulder ER: 35 with empty end feel   R shoulder IR: 55 with empty end feel     Shoulder MMT (/5) -  DNT due to pain      Treatment -   Therapeutic Exercise (83526) -  Assessment  Manual Therapy (32970) -    STM to R shoulder biceps, pectorals, teres, infraspinatus, subscapularis, UT and levator    ASSESSMENT:     Pt is progressing towards her goals and demonstrates improvements with both ROM and strength at this time. Pt reports an increased ability to perform ADLs, but still exhibits deficits. Pt will be sent back to the doctor at this time due to MRI results and further assessment. Pt will be put on hold for 30 days.    PLAN:  PT Plan: No Additional PT interventions  required at this time  Duration: Hold for 30 days for MRI follow up with physician.  Rehab Potential: Fair  Plan of Care Agreement: Patient  Hold for 30 days.    Goals -    STG - After about 6 weeks:  Pt will report less than a 4/10 pain at the worst. (Goal Regressing)  Pt will increase by 1 MMT grade for the R shoulder in order to aid with completion of ADLs. (DNT)  Pt will report MDIC with Quick DASH score. (DNT)  Pt will have AROM by 10 degrees for the R shoulder. (Goal Regressing)     LTG - after about 12 weeks:  Pt will report less than a 0-2/10 pain at the worst. (Goal Regressing)  Pt will have at least 4+/5 to 5/5 strength for the R shoulder in order to aid with completion of ADLs. (DNT)  Pt will have AROM to WFL for the R shoulder. (Goal Regressing)  Pt will report 50% improvement with Quick DASH score. (DNT)  Pt will be independent with progressed HEP. (Goal PROGRESSING)

## 2025-04-22 ENCOUNTER — APPOINTMENT (OUTPATIENT)
Dept: PHYSICAL THERAPY | Facility: CLINIC | Age: 55
End: 2025-04-22
Payer: COMMERCIAL

## 2025-04-24 ENCOUNTER — OFFICE VISIT (OUTPATIENT)
Dept: ORTHOPEDIC SURGERY | Facility: CLINIC | Age: 55
End: 2025-04-24
Payer: COMMERCIAL

## 2025-04-24 DIAGNOSIS — M75.01 ADHESIVE CAPSULITIS OF RIGHT SHOULDER: ICD-10-CM

## 2025-04-24 DIAGNOSIS — M75.81 ROTATOR CUFF TENDINITIS, RIGHT: Primary | ICD-10-CM

## 2025-04-24 DIAGNOSIS — S43.431A SUPERIOR LABRUM ANTERIOR-TO-POSTERIOR (SLAP) TEAR OF RIGHT SHOULDER: ICD-10-CM

## 2025-04-24 PROCEDURE — 99212 OFFICE O/P EST SF 10 MIN: CPT | Performed by: STUDENT IN AN ORGANIZED HEALTH CARE EDUCATION/TRAINING PROGRAM

## 2025-04-24 NOTE — PROGRESS NOTES
Acute Injury New Patient Visit    HPI: Kris is a 54 y.o.female who presents today for follow-up of her right rotator cuff tendinitis, concern for developing adhesive capsulitis, and MRI results review.  MRI does show a SLAP tear with involvement of the long head of the biceps, partial tear of the supraspinatus tendon, and evidence of adhesive capsulitis.  She is still having difficulty with reaching above her head and across her body, especially behind her.  She feels as though she is worse after doing physical therapy.  Denies any interval falls or injuries.    On 3/13/2025, she presented for follow-up of her right rotator cuff tendinitis.  States that she is better overall.  She had done physical therapy.  She states that she is feeling a stiffness.  She is having difficulty reaching behind.  She tried dry needling, which helped with some anterior shoulder pain, thought to be biceps tendinitis.  She has been doing her home exercises.  Reaching behind her still hurts.  Heat seems to help.  Denies any interval falls or injuries    On 2/3/2025, she presented with new complaints of right shoulder injury.  She is right-hand dominant.  For approximately 4 months she has been having some right shoulder pain that is anterior.  She denies any falls or injuries.  She denies any neck pain and numbness tingling.  She states that reaching behind her makes it worse.  She does not note any popping, clicking, or locking.  She is been trying IcyHot, Tiger balm, Biofreeze.    Plan: Today, on 4/24/2025, given the multitude of findings on MRI, the patient would like to discuss possible surgical intervention as she is very active and would like the best fix possible.  I will refer her to one of my surgical partners.    On 3/13/2025, for this right rotator cuff tendinitis and possible developing right adhesive capsulitis, will continue physical therapy, home exercises, and obtain an MRI of the right shoulder for further  characterization of what may be a partial rotator cuff tear due to her loss of range of motion in abduction and forward flexion.  Would consider a corticosteroid injection at follow-up, but decide between glenohumeral versus subacromial if we decide this is more related to rotator cuff versus adhesive capsulitis.    On 2/3/2025, for this right rotator cuff tendinitis, we will start her naproxen, physical therapy.  Additionally, discussed conservative treatment measures including rest, ice, elevation, compression, and over-the-counter analgesia as needed and as appropriate.  Risks of NSAID use, steroid use, and muscle relaxers discussed in depth and considered in light of medical comorbidities.  Patient, and parent/guardian as applicable, understand agree with plan.  Follow-up: With one of our shoulder surgeons.  X-rays on follow-up: None      Assessment:   Problem List Items Addressed This Visit    None  Visit Diagnoses         Rotator cuff tendinitis, right    -  Primary      Adhesive capsulitis of right shoulder          Superior labrum anterior-to-posterior (SLAP) tear of right shoulder                    Diagnostics: Reviewed all relevant imaging including x-ray, MRI, CT, and US.      Procedure:  Procedures    Physical Exam:  GENERAL:  No obvious acute distress.  NEURO:  Distally neurovascularly intact.  Sensation intact to light touch.  Extremity: Right shoulder exam shows:  Skin is intact;  No erythema or warmth;  No edema or ecchymosis;  No clinical signs of infection;  Can forward flex to 90 degrees;  Abduction to 90 degrees;  External rotation from neutral at 75 degrees;  Internal rotation at the level of T10;  POSITIVE signs of impingement with Guillen or Neer's test;  Negative empty can test;  Negative crossarm test;  No pain over the AC joint;  Negative [place]'s test;  Negative sulcus sign;  Negative anterior apprehension's test; and  Neurovascularly intact.    No orders of the defined types were placed  in this encounter.     At the conclusion of the visit there were no further questions by the patient/family regarding their plan of care.  Patient was instructed to call or return with any issues, questions, or concerns regarding their injury and/or treatment plan described above.     04/24/25 at 6:30 PM - Perez Wagner DO    Office: [phone]    This note was prepared using voice recognition software.  The details of this note are correct and have been reviewed, and corrected to the best of my ability.  Some grammatical errors may persist related to the Dragon software.

## 2025-04-29 ENCOUNTER — APPOINTMENT (OUTPATIENT)
Dept: PHYSICAL THERAPY | Facility: CLINIC | Age: 55
End: 2025-04-29
Payer: COMMERCIAL

## 2025-05-02 NOTE — PROGRESS NOTES
History: Kris is here for her right shoulder. Her shoulder has been bothering her for over 7 months now. In January, she went to reach for a file and felt what she describes as a zing down her shoulder. She saw Dr. Wagner afterwards and did anti-inflammatories and formal therapy. She did stop therapy because she felt that it was worsening the shoulder. Heat does improve the pain. She also obtained a MRI in April. She is right hand dominant. She denies numbness and tingling down the arm.     Past medical history: Multiple  Medications: Multiple  Allergies: No known drug allergies    Please refer to the intake H&P regarding the patient's review of systems, family history and social history as was done today    HEENT: Normal  Lungs: Clear to auscultation  Heart: RRR  Abdomen: Soft, nontender  Skin: clear  Extremity: Within the arm past 90 degrees of abduction with scapular elevation noted.  External rotation is to 15 degrees on the right compared to 70 degrees on the left.  Internal rotation is to the sacrum.  No numbness or tingling.  No neck pain.  Contralateral exam is normal for strength, motion, stability and neurovascular assessment.    Radiographs: Previous MRI of the shoulder shows a partial-thickness SLAP tear into the long head biceps tendon, mild AC degenerative change, as well as evidence of adhesive capsulitis. Previous x-rays of the right shoulder are essentially negative.     Assessment: Right shoulder SLAP tear with adhesive capsulitis     Plan: We discussed several options for treatment today including injections as well as surgical intervention. Surgically, this would be a manipulation under anesthesia with lysis of adhesions and a diagnostic arthroscopy. We also discussed the 3 phases of frozen shoulder. She does understand that she is in the first two phases of this. At this point, she is getting frustrated with her continued lack of function and she would like to proceed with surgery.  Risks,  benefits, and alternatives to surgery were discussed.  These do include infection, further stiffness, nerve damage, continued pain and deformity as well as need for subsequent operations. We will see her back pre-operatively and upon clearance.  We would evaluate her biceps and labral complex at surgery as well as subacromial space as needed  All questions were answered today with the patient.    Scribe Attestation  By signing my name below, ITierra Scribe   attest that this documentation has been prepared under the direction and in the presence of Saad England MD.

## 2025-05-05 ENCOUNTER — OFFICE VISIT (OUTPATIENT)
Dept: ORTHOPEDIC SURGERY | Facility: CLINIC | Age: 55
End: 2025-05-05
Payer: COMMERCIAL

## 2025-05-05 DIAGNOSIS — M75.01 ADHESIVE CAPSULITIS OF RIGHT SHOULDER: Primary | ICD-10-CM

## 2025-05-05 DIAGNOSIS — S43.431A SUPERIOR LABRUM ANTERIOR-TO-POSTERIOR (SLAP) TEAR OF RIGHT SHOULDER: ICD-10-CM

## 2025-05-05 PROCEDURE — 99212 OFFICE O/P EST SF 10 MIN: CPT | Performed by: ORTHOPAEDIC SURGERY

## 2025-05-05 PROCEDURE — 99214 OFFICE O/P EST MOD 30 MIN: CPT | Performed by: ORTHOPAEDIC SURGERY

## 2025-05-05 RX ORDER — ACETAMINOPHEN 500 MG
500 TABLET ORAL EVERY 6 HOURS PRN
COMMUNITY

## 2025-05-05 RX ORDER — ESTRADIOL 0.1 MG/G
CREAM VAGINAL
COMMUNITY
Start: 2022-07-18

## 2025-05-05 RX ORDER — FLUTICASONE PROPIONATE 50 MCG
1 SPRAY, SUSPENSION (ML) NASAL DAILY
COMMUNITY
Start: 2024-07-15

## 2025-05-06 ENCOUNTER — LAB (OUTPATIENT)
Dept: LAB | Facility: HOSPITAL | Age: 55
End: 2025-05-06
Payer: COMMERCIAL

## 2025-05-06 ENCOUNTER — HOSPITAL ENCOUNTER (OUTPATIENT)
Dept: CARDIOLOGY | Facility: HOSPITAL | Age: 55
Discharge: HOME | End: 2025-05-06
Payer: COMMERCIAL

## 2025-05-06 ENCOUNTER — APPOINTMENT (OUTPATIENT)
Dept: PHYSICAL THERAPY | Facility: CLINIC | Age: 55
End: 2025-05-06
Payer: COMMERCIAL

## 2025-05-06 DIAGNOSIS — S43.431A SUPERIOR GLENOID LABRUM LESION OF RIGHT SHOULDER, INITIAL ENCOUNTER: ICD-10-CM

## 2025-05-06 DIAGNOSIS — M75.01 ADHESIVE CAPSULITIS OF RIGHT SHOULDER: ICD-10-CM

## 2025-05-06 LAB
ALBUMIN SERPL BCP-MCNC: 4.8 G/DL (ref 3.4–5)
ALP SERPL-CCNC: 49 U/L (ref 33–110)
ALT SERPL W P-5'-P-CCNC: 14 U/L (ref 7–45)
ANION GAP SERPL CALC-SCNC: 10 MMOL/L (ref 10–20)
APPEARANCE UR: CLEAR
AST SERPL W P-5'-P-CCNC: 15 U/L (ref 9–39)
ATRIAL RATE: 84 BPM
BASOPHILS # BLD AUTO: 0.06 X10*3/UL (ref 0–0.1)
BASOPHILS NFR BLD AUTO: 0.9 %
BILIRUB SERPL-MCNC: 0.6 MG/DL (ref 0–1.2)
BILIRUB UR STRIP.AUTO-MCNC: NEGATIVE MG/DL
BUN SERPL-MCNC: 28 MG/DL (ref 6–23)
CALCIUM SERPL-MCNC: 9.8 MG/DL (ref 8.6–10.3)
CHLORIDE SERPL-SCNC: 106 MMOL/L (ref 98–107)
CO2 SERPL-SCNC: 28 MMOL/L (ref 21–32)
COLOR UR: YELLOW
CREAT SERPL-MCNC: 0.73 MG/DL (ref 0.5–1.05)
EGFRCR SERPLBLD CKD-EPI 2021: >90 ML/MIN/1.73M*2
EOSINOPHIL # BLD AUTO: 0.21 X10*3/UL (ref 0–0.7)
EOSINOPHIL NFR BLD AUTO: 3.3 %
ERYTHROCYTE [DISTWIDTH] IN BLOOD BY AUTOMATED COUNT: 13 % (ref 11.5–14.5)
GLUCOSE SERPL-MCNC: 83 MG/DL (ref 74–99)
GLUCOSE UR STRIP.AUTO-MCNC: NORMAL MG/DL
HCT VFR BLD AUTO: 41.2 % (ref 36–46)
HGB BLD-MCNC: 13.4 G/DL (ref 12–16)
HOLD SPECIMEN: 293
IMM GRANULOCYTES # BLD AUTO: 0.01 X10*3/UL (ref 0–0.7)
IMM GRANULOCYTES NFR BLD AUTO: 0.2 % (ref 0–0.9)
INR PPP: 1 (ref 0.9–1.1)
KETONES UR STRIP.AUTO-MCNC: NEGATIVE MG/DL
LEUKOCYTE ESTERASE UR QL STRIP.AUTO: ABNORMAL
LYMPHOCYTES # BLD AUTO: 2.27 X10*3/UL (ref 1.2–4.8)
LYMPHOCYTES NFR BLD AUTO: 35.6 %
MCH RBC QN AUTO: 30.8 PG (ref 26–34)
MCHC RBC AUTO-ENTMCNC: 32.5 G/DL (ref 32–36)
MCV RBC AUTO: 95 FL (ref 80–100)
MONOCYTES # BLD AUTO: 0.43 X10*3/UL (ref 0.1–1)
MONOCYTES NFR BLD AUTO: 6.8 %
MUCOUS THREADS #/AREA URNS AUTO: NORMAL /LPF
NEUTROPHILS # BLD AUTO: 3.39 X10*3/UL (ref 1.2–7.7)
NEUTROPHILS NFR BLD AUTO: 53.2 %
NITRITE UR QL STRIP.AUTO: NEGATIVE
NRBC BLD-RTO: 0 /100 WBCS (ref 0–0)
P AXIS: 64 DEGREES
P OFFSET: 186 MS
P ONSET: 140 MS
PH UR STRIP.AUTO: 6 [PH]
PLATELET # BLD AUTO: 250 X10*3/UL (ref 150–450)
POTASSIUM SERPL-SCNC: 4.3 MMOL/L (ref 3.5–5.3)
PR INTERVAL: 142 MS
PROT SERPL-MCNC: 7.2 G/DL (ref 6.4–8.2)
PROT UR STRIP.AUTO-MCNC: NEGATIVE MG/DL
PROTHROMBIN TIME: 11.2 SECONDS (ref 9.8–12.4)
Q ONSET: 211 MS
QRS COUNT: 13 BEATS
QRS DURATION: 76 MS
QT INTERVAL: 344 MS
QTC CALCULATION(BAZETT): 406 MS
QTC FREDERICIA: 384 MS
R AXIS: -8 DEGREES
RBC # BLD AUTO: 4.35 X10*6/UL (ref 4–5.2)
RBC # UR STRIP.AUTO: NEGATIVE MG/DL
RBC #/AREA URNS AUTO: NORMAL /HPF
SODIUM SERPL-SCNC: 140 MMOL/L (ref 136–145)
SP GR UR STRIP.AUTO: 1.03
T AXIS: 16 DEGREES
T OFFSET: 383 MS
UROBILINOGEN UR STRIP.AUTO-MCNC: NORMAL MG/DL
VENTRICULAR RATE: 84 BPM
WBC # BLD AUTO: 6.4 X10*3/UL (ref 4.4–11.3)
WBC #/AREA URNS AUTO: NORMAL /HPF

## 2025-05-06 PROCEDURE — 81001 URINALYSIS AUTO W/SCOPE: CPT | Performed by: PHYSICIAN ASSISTANT

## 2025-05-06 PROCEDURE — 36415 COLL VENOUS BLD VENIPUNCTURE: CPT | Performed by: ORTHOPAEDIC SURGERY

## 2025-05-06 PROCEDURE — 93010 ELECTROCARDIOGRAM REPORT: CPT | Performed by: INTERNAL MEDICINE

## 2025-05-06 PROCEDURE — 93005 ELECTROCARDIOGRAM TRACING: CPT

## 2025-05-06 PROCEDURE — 85610 PROTHROMBIN TIME: CPT

## 2025-05-06 PROCEDURE — 80053 COMPREHEN METABOLIC PANEL: CPT

## 2025-05-06 PROCEDURE — 87086 URINE CULTURE/COLONY COUNT: CPT | Mod: ELYLAB | Performed by: PHYSICIAN ASSISTANT

## 2025-05-06 PROCEDURE — 85025 COMPLETE CBC W/AUTO DIFF WBC: CPT

## 2025-05-07 LAB — BACTERIA UR CULT: NORMAL

## 2025-05-19 ENCOUNTER — EVALUATION (OUTPATIENT)
Dept: PHYSICAL THERAPY | Facility: CLINIC | Age: 55
End: 2025-05-19
Payer: COMMERCIAL

## 2025-05-19 ENCOUNTER — OFFICE VISIT (OUTPATIENT)
Dept: ORTHOPEDIC SURGERY | Facility: CLINIC | Age: 55
End: 2025-05-19
Payer: COMMERCIAL

## 2025-05-19 DIAGNOSIS — M25.511 ACUTE PAIN OF RIGHT SHOULDER: Primary | ICD-10-CM

## 2025-05-19 DIAGNOSIS — M75.81 ROTATOR CUFF TENDONITIS, RIGHT: ICD-10-CM

## 2025-05-19 DIAGNOSIS — M75.01 ADHESIVE CAPSULITIS OF RIGHT SHOULDER: Primary | ICD-10-CM

## 2025-05-19 PROCEDURE — 99212 OFFICE O/P EST SF 10 MIN: CPT | Performed by: PHYSICIAN ASSISTANT

## 2025-05-19 PROCEDURE — L3670 SO ACRO/CLAV CAN WEB PRE OTS: HCPCS | Performed by: PHYSICIAN ASSISTANT

## 2025-05-19 PROCEDURE — 97535 SELF CARE MNGMENT TRAINING: CPT | Mod: GP

## 2025-05-19 PROCEDURE — 97161 PT EVAL LOW COMPLEX 20 MIN: CPT | Mod: GP

## 2025-05-19 RX ORDER — HYDROCODONE BITARTRATE AND ACETAMINOPHEN 5; 325 MG/1; MG/1
1 TABLET ORAL EVERY 8 HOURS PRN
Qty: 18 TABLET | Refills: 0 | Status: SHIPPED | OUTPATIENT
Start: 2025-05-19 | End: 2025-05-25

## 2025-05-19 ASSESSMENT — PAIN SCALES - GENERAL: PAINLEVEL_OUTOF10: 1

## 2025-05-19 ASSESSMENT — PAIN - FUNCTIONAL ASSESSMENT: PAIN_FUNCTIONAL_ASSESSMENT: 0-10

## 2025-05-19 NOTE — PROGRESS NOTES
Kirs Dumont is here today for a pre-op visit for her right shoulder.  She has known adhesive capsulitis.  She is ready to proceed with a manipulation under anesthesia with lysis of adhesions and diagnostic arthroscopy.    This is a pre-op visit to discuss the risks and benefits of surgery. The risks and benefits were fully explained to the patient. The patient wishes to proceed.     With any surgery, infection is a risk; usually 1-2%. It can become higher for individuals with diabetes, rheumatoid arthritis, previous surgery, individuals on oral steroids or immunosuppressants, or obese individuals. All of these issues were properly addressed and considered. Reassured all sterile techniques would be followed.  Severe infections may require removal of any prosthesis (if applicable). Cadaver/allograft tissues may be used for certain surgeries.  Although extremely rare, there is a less than 0.5% risk of disease transmission.     The patient will be given preop antibiotics. It was also explained to the patient that there will be some blood loss during the procedure, but that blood transfusion is usually not necessary.  If applicable, it is also noted that a tourniquet may be applied to lower the amount of blood loss during the case.    Pulmonary embolism and blood clots were also discussed with the patient. These can have fatal complications. It is explained to the patient that for certain surgeries the use of ANIKET hose, foot pumps, incentive spirometers, quick mobility and the use of blood thinners/Aspirin is the standard preventative care.     It was explained that surgery is often used to decrease pain, provide stability, and improve strength but individuals will have varying results postoperatively. There can be variation in motion and a risk of stiffness. It is also stated that occasionally we will have to manipulate an extremity if pain and stiffness persist. We also discussed there are limitations with some  motions after certain surgeries.     Fracture, though rare, may also occur intraoperatively. There is also the possibility of nerve or vascular injuries as well. There is potential for continued numbness or tingling. The benefits of anesthesia were explained to the patient.     All of the patient's questions were answered.     Results/Imaging: MRI showed a frozen shoulder with some degenerative labral tearing.    Assessment: Right shoulder he is capsulitis with degenerative labral tearing    Plan:   I have personally reviewed the OARRS report for this patient. This report is scanned into the electronic medical record. I have considered the risks of abuse, dependence, addiction, and diversion. The patient's current pain level is 5.  Post operative pain medication was sent to the patient's pharmacy.  The patient will not begin taking this until after surgery.     She would prefer a biceps tenodesis if needed.  She understands that we will also evaluate her joint, rotator cuff and subacromial space to assess for further internal derangement.    She will follow up 1 week post op.  We can start therapy at that point.    This note was generated with voice recognition software and may contain grammatical errors.

## 2025-05-19 NOTE — PROGRESS NOTES
"Physical Therapy Evaluation    Patient Name: Kris Dumont  MRN: [id number]  Time Calculation  Start Time: 075  Stop Time: 08  Time Calculation (min): 30 min  PT Evaluation Time Entry  PT Evaluation (Low) Time Entry: 15  PT Therapeutic Procedures Time Entry  Self-Care/Home Mgmt Training: 15                   Current Problem  1. Acute pain of right shoulder        2. Rotator cuff tendonitis, right          Insurance    Insurance reviewed   Visit number: 1  ANTHEM BOA COPAY 0 DED 6400(3200)  COVERAGE 100 OOP 4000(3200) 8000(3200)   AUTH REQ # [id number] 1 VISIT 25 THRU 25       Subjective   General:  Patient is a 53 y/o F who is here today who is here today for pre-op evaluation for scheduled R shoulder NARAYAN with lysis of adhesion and diagnostic arthroscopy on 25 with Dr. Saad England. Patient reports that last Fall, she started to notice that her shoulder was bothering her and it was sore, but at the beginning of the year she was reaching for a file and felt a \"zing\" down her arm. She had been participating in PT, but felt that it was making her shoulder worse. She did get an MRI in April, which revealed adhesive capsulitis, partial thickness supraspinatus tear and SLAP tear. She has failed conservative intervention, and is thus electing surgical intervention at this time.    PMHx significant of;    Precautions:  None   Pain:  Current: 1/10, R shoulder, aching  Worst: 2-3/10, throbbing  Best: 1/10  Pain Exacerbating Factors: lifting, carrying, reaching, driving, sleeping, ADLs/IADLs, work duties  Pain Relieving Factors: OTC pain relievers as needed, heat  Reviewed medical screening form with pt and medical screening assessed    Imagin.  Findings consistent with adhesive capsulitis.   2.  Supraspinatus tendinosis with a low-grade partial-thickness articular sided tear just proximal to the humeral attachment.   3.  SLAP tear extending into the origin of the long head biceps tendon.   4.  Mild " acromioclavicular degenerative change.     Objective   Shoulder Musculoskeletal Exam    Range of Motion    Right      Active ROM: abnormal and pain.       Active forward elevation: 90.       Shoulder active abduction: 70.       External rotation 0 degrees: 25.       Internal rotation: side.     Left      Left shoulder range of motion is normal.     Strength    Right      External rotation: 4-/5. External rotation is affected by pain.       Internal rotation: 5/5. Internal rotation is affected by pain.       Abduction: 4/5. Abduction is affected by pain.       Biceps: 5/5. Biceps are affected by pain.       Triceps: 5/5. Triceps are affected by pain.     Left      Left shoulder strength is normal.      Treatment:   Pre-op education  Post-op exercises    Assessment:  PT Pre-Op Assessment  Patient demonstrates independent knowledge and understanding of: anatomy, surgical procedure, post-operative exercise programs, signs and symptoms of post-operative infection and post-operative management of pain.    Patient demonstrate ability to don/doff R shoulder sling    Handouts Given: surgical overview booklet, post-operative exercises    Plan of Care: Patient will be placed on hold for therapy until after completion of surgical procedure. Upon return to therapy, patient's status will be re-assessed and goal updated    Patient plans for post op follow up at this location. Will schedule upon clearance from Dr. England      Clinical Presentation: Stable     Level of Complexity: Low    Goals:  n/a, pre-op eval    Plan  Patient will be placed on hold pending completion of surgical procedure. Will start post-op PT pending clearance from Dr. England.

## 2025-05-22 PROCEDURE — 29825 SHO ARTHRS SRG LSS&RESCJ ADS: CPT | Performed by: ORTHOPAEDIC SURGERY

## 2025-05-22 PROCEDURE — 29826 SHO ARTHRS SRG DECOMPRESSION: CPT | Performed by: ORTHOPAEDIC SURGERY

## 2025-05-22 PROCEDURE — 29825 SHO ARTHRS SRG LSS&RESCJ ADS: CPT | Performed by: PHYSICIAN ASSISTANT

## 2025-05-22 PROCEDURE — 29823 SHO ARTHRS SRG XTNSV DBRDMT: CPT | Performed by: ORTHOPAEDIC SURGERY

## 2025-05-22 PROCEDURE — 29826 SHO ARTHRS SRG DECOMPRESSION: CPT | Performed by: PHYSICIAN ASSISTANT

## 2025-05-22 PROCEDURE — 29823 SHO ARTHRS SRG XTNSV DBRDMT: CPT | Performed by: PHYSICIAN ASSISTANT

## 2025-05-23 DIAGNOSIS — M75.01 ADHESIVE CAPSULITIS OF RIGHT SHOULDER: Primary | ICD-10-CM

## 2025-05-28 ENCOUNTER — EVALUATION (OUTPATIENT)
Dept: PHYSICAL THERAPY | Facility: CLINIC | Age: 55
End: 2025-05-28
Payer: COMMERCIAL

## 2025-05-28 ENCOUNTER — APPOINTMENT (OUTPATIENT)
Dept: ORTHOPEDIC SURGERY | Facility: CLINIC | Age: 55
End: 2025-05-28
Payer: COMMERCIAL

## 2025-05-28 DIAGNOSIS — M25.511 ACUTE PAIN OF RIGHT SHOULDER: Primary | ICD-10-CM

## 2025-05-28 DIAGNOSIS — M75.01 ADHESIVE CAPSULITIS OF RIGHT SHOULDER: Primary | ICD-10-CM

## 2025-05-28 PROCEDURE — 97161 PT EVAL LOW COMPLEX 20 MIN: CPT | Mod: GP

## 2025-05-28 PROCEDURE — 97110 THERAPEUTIC EXERCISES: CPT | Mod: GP

## 2025-05-28 PROCEDURE — 97140 MANUAL THERAPY 1/> REGIONS: CPT | Mod: GP

## 2025-05-28 PROCEDURE — 99024 POSTOP FOLLOW-UP VISIT: CPT | Performed by: PHYSICIAN ASSISTANT

## 2025-05-28 NOTE — PROGRESS NOTES
Physical Therapy Evaluation    Patient Name: Kris Dumont  MRN: [id number]  Time Calculation  Start Time: 1310  Stop Time: 1350  Time Calculation (min): 40 min  PT Evaluation Time Entry  PT Evaluation (Low) Time Entry: 10  PT Therapeutic Procedures Time Entry  Manual Therapy Time Entry: 10  Therapeutic Exercise Time Entry: 20                   Current Problem  1. Acute pain of right shoulder  Follow Up In Physical Therapy        Insurance    Insurance reviewed   Visit number: 1  Approved number of visits: BOA   ANTHEM BOA COPAY 0 DED 6400(320)  COVERAGE 100 OOP 4000(3206) 8000(3206) AUTH REQ#[id number]  1 VISIT 5-19-25 THRU 6-2-25 REF# [id number]/ALL     Subjective   General:  Pt reports to the clinic with complaint of R shoulder pain s/p R shoulder NARAYAN for lysis of adhesion removal for adhesive capsulitis on 5/22/25 (6 days) with Dr. Oscar England. Sx started last fall of 2024 with no JAVIER or PMH of DM. She does also have a partial thickness tear of supraspinatus and biceps tendon, but these were non-op. She also saw PT here, but it unfortunately got worse. Pt denies radicular Sx. Pt would like to return to normal ROM following completion of PT. Patient denies any abnormal/calf pain, acute/abnormal lower leg swelling, shortness of breath or any overt signs of DVT/PE. Patient also denies any acute irritation of surgical site, no odorous/abnormally colored discharge or overt signs of infection. Denies any falls. Pt denies any painful popping, clicking, catching or buckling. Pt is R hand dominant and has weaned off sling since nerve block wore off. Pt denies any /GI changes, fever/night sweats/pain, loss of appetite, or unintentional weight loss/gain.     Occupation:  at law firm     Lifestyle: dogs, gardening     Living Environment: lives with family       Precautions:  As tolerated     Pain:  Description of Symptoms:    Pain: sharp, dull, aching, throbbing   Location: R shoulder   Severity:  current 0/10  Best 0/10  Worst 10/10   Intensity Variation: has worsened slightly after nerve block wore off   Duration of Pain: intermittent  Aggravating Factors: reaching, pushing/pushing, lifting   Relieving Factors: ice, OTC pain relief   Associated Symptoms: none     Reviewed medical screening form with pt and medical screening assessed    Objective     Shoulder Musculoskeletal Exam    Range of Motion    Right      Active ROM: no pain.       Passive ROM: pain.       Forward elevation: 122.       Passive forward elevation: 92.       Shoulder active abduction: 110.       Passive abduction: 120.       Right passive external rotation 90 degrees: 42.       Passive internal rotation in abduction: 60.       Internal rotation: side.     Left      Left shoulder range of motion is normal.       Active ROM: no pain.     Range of motion additional comments: R functional ER: to back of neck     Strength    Right      External rotation: 4-/5.       Internal rotation: 5/5.       Abduction: 4-/5.       Biceps: 4-/5.       Triceps: 5/5.     Left      Left shoulder strength is normal.     Strength additional comments: R deltoid: 4-/5       Outcome Measures:  Other Measures  Disability of Arm Shoulder Hand (DASH): 48     Treatment:   Includes measures for eval     Manual: NATALIE PROM- all planes- 8'     EDUCATION/HEP:  Access Code: [email or web address]  URL: [email or web address]  Date: 05/28/2025  Prepared by: Maryellen Sauer    Exercises  - Supine Shoulder Flexion Extension AAROM with Dowel  - 2 x daily - 4 x weekly - 15 reps  - Supine Shoulder External Rotation with Dowel  - 2 x daily - 4 x weekly - 15 reps  - Supine Shoulder Abduction AAROM with Dowel  - 1 x daily - 4 x weekly - 2 sets - 10 reps  - Standing Bilateral Shoulder Internal Rotation AAROM with Dowel  - 2 x daily - 4 x weekly - 15 reps  - Seated Shoulder Flexion Towel Slide at Table Top  - 2 x daily - 4 x weekly - 15 reps  - Seated Scapular Retraction  - 1 x  daily - 4 x weekly - 2 sets - 10 reps  - Seated Cervical Retraction  - 1 x daily - 4 x weekly - 15 reps    Assessment:  Pt is here for an evaluation s/p R shoulder NARAYAN for lysis of adhesion removal after adhesive capsulitis with no JAVIER. Pt demonstrates diminished range of motion in both A/PROM to 122 degrees actively and 90 degrees passively. Passive and active ER most limited. Strength is mildly decreased in RUE compared to contralateral limb. Pt stands to benefit from skilled PT in order to improve R shoulder ROM, RUE strength, stability, flexibility, and endurance in order to PLOF.       Clinical Presentation: Stable    Level of Complexity: Low    Goals:  Pt will report at least 75% improvement in R shoulder pain during everyday activities.  Pt will demo >/= 4+/5 strength of  shoulder musculature without pain.  Pt will demo symmetrical A/PROM of shoulder to 170-180 degrees without pain.  Pt will improve Quick DASH score by at least 20% (MCID) to indicate a significant improvement in overall function.   Pt will demo independence and report compliance with HEP.      Plan  2x/week for 10 visits     Skilled therapeutic intervention to address the above mentioned physical and functional impairments and limitations including, but not limited to: patient education, therapeutic exercise, therapeutic activity, manual therapy, body mechanics training, dry needling, blood flow restriction training, instrumented soft tissue mobilization, manual soft tissue mobilization, gait retraining, biofeedback, cryotherapy, electrical stimulation, home program development, hot pack, taping, neuromuscular re-education, self-care/home management, and vasopneumatic compression.

## 2025-05-28 NOTE — PROGRESS NOTES
History: Kris is here for her right shoulder.  She is 1 week out from manipulation under anesthesia and lysis of adhesions for adhesive capsulitis.  She has been to her initial therapy evaluation and has been doing some exercises on her own.  She has some soreness but is no longer taking any narcotics.  She continues with her ibuprofen.    Physical Exam: She can get the arm overhead to 120 degrees of forward flexion.  Abduction is similar.  Passive external rotation is to 40 degrees today.  Internal rotation is limited to the sacrum.  No numbness or tingling distally.  Wounds are healing nicely.    Radiographs: None today     Assessment: Stable right shoulder status post manipulation under esthesia and lysis of adhesions for adhesive capsulitis    Plan: Overall she is doing very well.  She is going to continue with her outpatient therapy and she can do full range of motion strengthening and modalities.  She can continue with an icing regimen and her ibuprofen as needed.  We will see her back in another month to assess progress.  All questions were answered with the patient.   This note was generated with voice recognition software and may contain grammatical errors.   Call to harinder to discuss health coaching.  Left message to return call

## 2025-06-03 ENCOUNTER — TREATMENT (OUTPATIENT)
Dept: PHYSICAL THERAPY | Facility: CLINIC | Age: 55
End: 2025-06-03
Payer: COMMERCIAL

## 2025-06-03 DIAGNOSIS — M75.81 ROTATOR CUFF TENDONITIS, RIGHT: ICD-10-CM

## 2025-06-03 DIAGNOSIS — M25.511 ACUTE PAIN OF RIGHT SHOULDER: Primary | ICD-10-CM

## 2025-06-03 PROCEDURE — 97140 MANUAL THERAPY 1/> REGIONS: CPT | Mod: GP,CQ

## 2025-06-03 PROCEDURE — 97110 THERAPEUTIC EXERCISES: CPT | Mod: GP,CQ

## 2025-06-03 ASSESSMENT — PAIN SCALES - GENERAL: PAINLEVEL_OUTOF10: 3

## 2025-06-03 ASSESSMENT — PAIN - FUNCTIONAL ASSESSMENT: PAIN_FUNCTIONAL_ASSESSMENT: 0-10

## 2025-06-03 ASSESSMENT — PAIN DESCRIPTION - DESCRIPTORS: DESCRIPTORS: ACHING

## 2025-06-03 NOTE — PROGRESS NOTES
Physical Therapy Treatment    Patient Name: Kris Dumont  MRN: [id number]  Today's Date: 6/3/2025  Time Calculation  Start Time: 1020  Stop Time: 1100  Time Calculation (min): 40 min     PT Therapeutic Procedures Time Entry  Manual Therapy Time Entry: 15  Therapeutic Exercise Time Entry: 25                 Insurance:   Visit number: 2 (POC 2x/10 visits)  Approved number of visits: BOA   ANTHEM BOA COPAY 0 DED 6400(320)  COVERAGE 100 OOP 4000(3206) 8000(3206) AUTH REQ#[id number]  1 VISIT 5-19-25 THRU 6-2-25 REF# [id number]/ALL     ANTHEM APPROVED  12  PT  VISITS   6-3-25 THRU  8-31-25  AUTH#  [id number]  [id number]/ALL    s/p R shoulder NARAYAN 5/22 for lysis of adhesion removal after adhesive capsulitis     Assessment:   Progressed pt's program this visit w/ addition of Supine Serratus Punches. More emphasis on PROM as pt initially guarded however was better able to relax w/ cues.  Empty end feel w/ flexion however her ROM did improve w/ manual intervention. Most tightness w/ ER/IR w/ those also being most painful motions per pt report. Updated her HEP this visit w/ pt demo'ing good understanding of. Will progress ROM ex's as tamar NV. Encouraged her to ice shoulder at home and cont w/ HEP as tamar.     Plan:   Cont to progress ROM and Strength as tamar. Update HEP accordingly.     Current Problem  1. Acute pain of right shoulder  Follow Up In Physical Therapy      2. Rotator cuff tendonitis, right  Follow Up In Physical Therapy          Subjective   General  Reason for Referral: R shoulder  Referred By: Dr. Saad England Pt states she has been compliant w/ HEP and icing and taking Ibuprofen as needed. Min discomfort coming in today.   Precautions  Precautions  Precautions Comment: she can do full range of motion strengthening and modalities.    Pain  Pain Assessment: 0-10  0-10 (Numeric) Pain Score: 3  Pain Location: Shoulder  Pain Orientation: Right, Anterior, Outer  Pain Descriptors: Aching  Pain Frequency:  Constant/continuous    Objective   6/3  R shoulder Flex AAROM 135 deg    Treatments:  Therapeutic Exercise (71317):   Supine Cane Chest Press 2x10  Supine Cane Flex, ER, Abd 2x10   *Supine Serratus Punch 2x10  Stdg Cane Ext/IR 2x10 ea  Seated Table slides Flex, Scap  Seated Scap retraction x10  Seated Cervical retraction x10  Pulleys - Add NV  SL Shoulder Flex, Abd, ER - Add NV    (*Added to HEP)     Manual (64057):  NATALIE PROM- all planes- 15'   EDUCATION:

## 2025-06-05 ENCOUNTER — TREATMENT (OUTPATIENT)
Dept: PHYSICAL THERAPY | Facility: CLINIC | Age: 55
End: 2025-06-05
Payer: COMMERCIAL

## 2025-06-05 DIAGNOSIS — M75.81 ROTATOR CUFF TENDONITIS, RIGHT: ICD-10-CM

## 2025-06-05 DIAGNOSIS — M25.511 ACUTE PAIN OF RIGHT SHOULDER: Primary | ICD-10-CM

## 2025-06-05 PROCEDURE — 97140 MANUAL THERAPY 1/> REGIONS: CPT | Mod: GP,CQ

## 2025-06-05 PROCEDURE — 97110 THERAPEUTIC EXERCISES: CPT | Mod: GP,CQ

## 2025-06-05 ASSESSMENT — PAIN - FUNCTIONAL ASSESSMENT: PAIN_FUNCTIONAL_ASSESSMENT: 0-10

## 2025-06-05 ASSESSMENT — PAIN SCALES - GENERAL: PAINLEVEL_OUTOF10: 4

## 2025-06-05 ASSESSMENT — PAIN DESCRIPTION - DESCRIPTORS: DESCRIPTORS: ACHING

## 2025-06-05 NOTE — PROGRESS NOTES
"Physical Therapy Treatment    Patient Name: Kris Dumont  MRN: [id number]  Today's Date: 6/5/2025  Time Calculation  Start Time: 0120  Stop Time: 0200  Time Calculation (min): 40 min     PT Therapeutic Procedures Time Entry  Manual Therapy Time Entry: 15  Therapeutic Exercise Time Entry: 25                 Insurance:   Visit number: 3 (POC 2x/10 visits)  Approved number of visits: BOA   ANTHEM BOA COPAY 0 DED 6400(320)  COVERAGE 100 OOP 4000(3206) 8000(3206) AUTH REQ#[id number]  1 VISIT 5-19-25 THRU 6-2-25 REF# [id number]/ALL      ANTHEM APPROVED  12  PT  VISITS   6-3-25 THRU  8-31-25  AUTH#  [id number]  [id number]/ALL     s/p R shoulder NARAYAN 5/22 for lysis of adhesion removal after adhesive capsulitis      Assessment:   Pt w/ slight increase in guarding w/ PROM today, likely due to increased soreness R shoulder in general today. Progressed her ex's today w/ addition of SL Flex, Abd and ER. Limited mobility w/ ER and some discomfort however no pain reported w/ flex or abd and was able to achieve 90 deg w/ min difficulty. Pt w/ most pain c/o w/ ER and IR. Poor tamar to IR passively this visit. Pt also tamar addition of Pulleys (Flex, Scap) w/ emphasis on end range stretch - good ability. She is making nice progress and will cont to benefit from skilled PT to address her deficits and improve her overall functional ability.     Plan:    Cont to progress ROM and Strength as tamar. Update HEP accordingly.     Current Problem  1. Acute pain of right shoulder  Follow Up In Physical Therapy      2. Rotator cuff tendonitis, right            Subjective   General  Pt states she has some \"soreness\" in the R shoulder coming in for her appt.   Precautions  Precautions  Precautions Comment: she can do full range of motion strengthening and modalities.    Pain  Pain Assessment: 0-10  0-10 (Numeric) Pain Score: 4  Pain Location: Shoulder  Pain Orientation: Right, Anterior  Pain Descriptors: Aching    Objective   6/3  R " shoulder Flex AAROM 135 deg    6/5  R shoulder Flex AAROM 139 deg    Treatments:  Therapeutic Exercise (93924):   Supine Cane Chest Press 2x10  Supine Cane Flex, ER, Abd 2x10   *Supine Serratus Punch 2x10  Stdg Cane Ext/IR 2x10 ea  Seated Table slides Flex, Scap x10 ea  Seated Scap retraction x10--  Seated Cervical retraction x10--  Pulleys Flex & Scap x15 ea  SL Shoulder Flex, Abd, ER - x10 ea     (*Added to HEP)    Manual (11979):  NATALIE PROM- all planes- 15'   EDUCATION:

## 2025-06-10 ENCOUNTER — TREATMENT (OUTPATIENT)
Dept: PHYSICAL THERAPY | Facility: CLINIC | Age: 55
End: 2025-06-10
Payer: COMMERCIAL

## 2025-06-10 DIAGNOSIS — M25.511 ACUTE PAIN OF RIGHT SHOULDER: ICD-10-CM

## 2025-06-10 PROCEDURE — 97110 THERAPEUTIC EXERCISES: CPT | Mod: GP

## 2025-06-10 NOTE — PROGRESS NOTES
"Patient Name: Kris Dumont  MRN: [id number]  Time Calculation  Start Time: 0745  Stop Time: 0823  Time Calculation (min): 38 min     PT Therapeutic Procedures Time Entry  Therapeutic Exercise Time Entry: 38                     Current Problem  1. Acute pain of right shoulder  Follow Up In Physical Therapy          Insurance  ANTHJOSE APPROVED  12  PT  VISITS   6-3-25 THRU  8-31-25  AUTH#  [id number]  [id number]/ALL    Visit 3/12     Subjective     General  Pt reports she is still feeling stiff/sore, mainly towards the top of the shoulder. Has found this to be pretty typical following her NARAYAN.   Precautions    Pain  5/10    Objective     Treatments:    Supine Cane Chest Press x10   Supine Cane Flex, ER, Abd x10   Seated ER + scap retraction:  YTB, [id number]   Ball on the wall CKC holds:  10x3-5s   Ball on the wall circles:  x20 CW/CCW   Push up plus on wall :  x15   Med ball series:  2x10 each   YTB ER iso uppercut:  2x10   Serratus wall slides:  2x10     OP EDUCATION/HEP:  Encouraged pt to add bands back to HEP ~4x/week       Heavy verbal/tactile cues for proper form with push up plus on the wall. Pt with limited scapular protraction initially but demo's good eventual carryover with cues.   Assessment   Pt with good tolerance to today's session. Able to complete progression of activity with more exercises in seated/standing against gravity. Generally she endorsed soreness/stiffness but found that increased repetitions would \"loosen up\" the shoulder. At EOS she denied any significant increase in pain, encouraged her to reintroduce band work roughly every other day. Pt still demo's noted weakness, ROM restriction, and pain with motion that would benefit from continued skilled PT in order to restore PLOF.     Plan     Continue per POC. Progress light strengthening/ROM exercises to tolerance    "

## 2025-06-12 ENCOUNTER — TREATMENT (OUTPATIENT)
Dept: PHYSICAL THERAPY | Facility: CLINIC | Age: 55
End: 2025-06-12
Payer: COMMERCIAL

## 2025-06-12 DIAGNOSIS — M25.511 ACUTE PAIN OF RIGHT SHOULDER: ICD-10-CM

## 2025-06-12 PROCEDURE — 97140 MANUAL THERAPY 1/> REGIONS: CPT | Mod: GP

## 2025-06-12 PROCEDURE — 97110 THERAPEUTIC EXERCISES: CPT | Mod: GP

## 2025-06-12 NOTE — PROGRESS NOTES
"Patient Name: Kris Dumont  MRN: [id number]  Time Calculation  Start Time: 0828  Stop Time: 0906  Time Calculation (min): 38 min     PT Therapeutic Procedures Time Entry  Therapeutic Exercise Time Entry: 30  Manual Therapy Time Entry: 8                     Current Problem  1. Acute pain of right shoulder  Follow Up In Physical Therapy          Insurance    [facility] APPROVED  12  PT  VISITS   6-3-25 THRU  8-31-25  AUTH#  [id number]  [id number]/ALL     Visit 4/12      Subjective     General  Pt reports her symptoms are generally the same, has been pretty busy already this morning and will be taking care of her grandkids for the weekend. Mainly complaining of a deeper \"jaret\" pain in her shoulder, still generally improves with activity.     Precautions    Pain  5/10    Objective     Treatments:    Supine Cane Flex, ER, Abd/CP x10   Seated ER + scap retraction:  YTB, 10x5s   YTB ER iso uppercut:  2x10 (progressed to RTB on 2nd set)   Ball on the wall CKC holds:  10x3-5s   Ball up the wall:  2x10-12 flx/abd   GTB rows/LAE:  x10  Face pulls:  RTB 2x10-15   YTB horizontal adduction:  2x8-10   Freemotion Tricep pulldowns:  1x15, 1x5 (12.5->15#)       Manual:  8' PROM all planes       OP EDUCATION/HEP:  Did not add exercises to pt's HEP, will update if she is not excessively sore following today's visit.     Assessment   Pt with generally good tolerance to today's visit. Still endorsing a consitent mid-grade pain/stiffness, and a deeper more \"jaret\" pain in the joint. She is able to complete all given activities but has most discomfort at around 45 degrees of scaption, with horizontal adduction. Pt generally did well with added exercises (face pulls, ball up the wall), still with some discomfort during PROM of IR/ER. She still appears to be progressing well. Recommend continued skilled PT to progress strength and ROM in order to restore PLOF.     Plan     Continue per POC. Progress HEP at next visit    "

## 2025-06-16 ENCOUNTER — TREATMENT (OUTPATIENT)
Dept: PHYSICAL THERAPY | Facility: CLINIC | Age: 55
End: 2025-06-16
Payer: COMMERCIAL

## 2025-06-16 DIAGNOSIS — M25.511 ACUTE PAIN OF RIGHT SHOULDER: Primary | ICD-10-CM

## 2025-06-16 PROCEDURE — 97110 THERAPEUTIC EXERCISES: CPT | Mod: GP,CQ

## 2025-06-16 PROCEDURE — 97140 MANUAL THERAPY 1/> REGIONS: CPT | Mod: GP,CQ

## 2025-06-16 ASSESSMENT — PAIN - FUNCTIONAL ASSESSMENT: PAIN_FUNCTIONAL_ASSESSMENT: 0-10

## 2025-06-16 ASSESSMENT — PAIN SCALES - GENERAL: PAINLEVEL_OUTOF10: 2

## 2025-06-16 ASSESSMENT — PAIN DESCRIPTION - DESCRIPTORS: DESCRIPTORS: ACHING

## 2025-06-16 NOTE — PROGRESS NOTES
"Physical Therapy Treatment    Patient Name: Kris Dumont  MRN: [id number]  Today's Date: 6/16/2025  Time Calculation  Start Time: 0545  Stop Time: 0630  Time Calculation (min): 45 min     PT Therapeutic Procedures Time Entry  Therapeutic Exercise Time Entry: 37  Manual Therapy Time Entry: 8                 Insurance:   ANTHEM APPROVED  12  PT  VISITS   6-3-25 THRU  8-31-25  AUTH#  [id number]  [id number]/ALL     Visit 5/12   Assessment:  Pt demo'd overall good ability to complete today's program. Improving PROM and less discomfort following manual. Pt presents w/ decreased scapulohumeral rhythm which was most noted w/ ball on wall CKC ex. Pt has most discomfort and tightness c/o along R UT and clavicle which increases slightly w/ ex.Updated pt's HEP this visit.   Plan:   Continue per POC.     Current Problem  1. Acute pain of right shoulder  Follow Up In Physical Therapy          Subjective   General  Reason for Referral: R shoulder  Referred By: Dr. Saad Ascencio states she felt pretty good after the last session and didn't have too much soreness. \"I took a few days where I only did the ex's/stretches 1x that day and that helped a lot.\"   Precautions  Precautions  Precautions Comment: she can do full range of motion strengthening and modalities.    Pain  Pain Assessment: 0-10  0-10 (Numeric) Pain Score: 2  Pain Location: Shoulder  Pain Orientation: Right, Anterior, Upper  Pain Descriptors: Aching    Objective       Treatments:  Therapeutic Exercise (14844):   Supine Cane Flex, ER, Abd/CP x15   Seated ER + scap retraction:  YTB, 10x5s   RTB ER iso uppercut:  x10, x8   Ball on the wall CKC holds:  10x3-5s --  Ball up the wall:  2x10-12 flx/abd   GTB rows/LAE:  x10  Face pulls:  RTB 2x10-15 --  YTB horizontal adduction:  2x8-10   Freemotion Tricep pulldowns:  1x15, 1x5 (12.5->15#)      Manual (23210):  8' PROM all planes   EDUCATION:  Outpatient Education  Education Provided: Home Exercise Program  Equipment: " Thera-Band (YELLOW)  Access Code: [email or web address]  URL: [email or web address]  Date: 06/16/2025  Prepared by: Frank Andrew    Exercises  - Standing Shoulder Horizontal Abduction with Resistance  - 1 x daily - 4-5 x weekly - 1 sets - 10 reps  - Shoulder External Rotation and Scapular Retraction with Resistance  - 1 x daily - 4-5 x weekly - 1 sets - 10 reps  - Standing Wall Ball Circles with Mini Swiss Ball  - 1 x daily - 4-5 x weekly - 1 sets - 10 reps

## 2025-06-18 ENCOUNTER — TREATMENT (OUTPATIENT)
Dept: PHYSICAL THERAPY | Facility: CLINIC | Age: 55
End: 2025-06-18
Payer: COMMERCIAL

## 2025-06-18 DIAGNOSIS — M25.511 ACUTE PAIN OF RIGHT SHOULDER: ICD-10-CM

## 2025-06-18 PROCEDURE — 97110 THERAPEUTIC EXERCISES: CPT | Mod: GP

## 2025-06-18 NOTE — PROGRESS NOTES
Physical Therapy Treatment    Patient Name: Kris Dumont  MRN: [id number]  Time Calculation  Start Time: 1615  Stop Time: 1655  Time Calculation (min): 40 min     PT Therapeutic Procedures Time Entry  Therapeutic Exercise Time Entry: 40                   Insurance:   ANTHEM APPROVED  12  PT  VISITS   6-3-25 THRU  8-31-25  AUTH#  [id number]  [id number]/ALL     Visit 6/12     Current Problem  1. Acute pain of right shoulder  Follow Up In Physical Therapy          Subjective   General  Pt states that she is doing well and has no pain coming in today.      Pain  0/10    Objective     Treatments:  UBE 2/2 fwd/bwd   Manual: all planes GHJ PROM - 8 mins     Seated cane flexion 2# 2 x 10   Seated cane abduction 2# 2 x 10   SA punch with flexion 5 x 5 green TB   ER pullouts 3 x 8 red TB  SL ER 3 x 8 2#   SL 3 way jobes 3  x8 1#   SA punch 3 x8 1#   Wall slides + lift 2 x 10   Abduction wall walking 2 x 10   Bent over I, Y, T 2 x 10 1#        Assessment   Pt able to tolerate all exercises very well today. ROM continues to improve. Pt continues to benefit from skilled PT in order to keep progressing towards their goals.       Plan   Continue current POC, progress as tolerated

## 2025-06-23 ENCOUNTER — OFFICE VISIT (OUTPATIENT)
Dept: ORTHOPEDIC SURGERY | Facility: CLINIC | Age: 55
End: 2025-06-23
Payer: COMMERCIAL

## 2025-06-23 ENCOUNTER — TREATMENT (OUTPATIENT)
Dept: PHYSICAL THERAPY | Facility: CLINIC | Age: 55
End: 2025-06-23
Payer: COMMERCIAL

## 2025-06-23 DIAGNOSIS — M25.511 ACUTE PAIN OF RIGHT SHOULDER: Primary | ICD-10-CM

## 2025-06-23 DIAGNOSIS — M75.01 ADHESIVE CAPSULITIS OF RIGHT SHOULDER: Primary | ICD-10-CM

## 2025-06-23 PROCEDURE — 99024 POSTOP FOLLOW-UP VISIT: CPT | Performed by: ORTHOPAEDIC SURGERY

## 2025-06-23 PROCEDURE — 99212 OFFICE O/P EST SF 10 MIN: CPT | Performed by: ORTHOPAEDIC SURGERY

## 2025-06-23 PROCEDURE — 97110 THERAPEUTIC EXERCISES: CPT | Mod: GP | Performed by: PHYSICAL THERAPIST

## 2025-06-23 ASSESSMENT — PAIN SCALES - GENERAL: PAINLEVEL_OUTOF10: 0 - NO PAIN

## 2025-06-23 ASSESSMENT — PAIN - FUNCTIONAL ASSESSMENT: PAIN_FUNCTIONAL_ASSESSMENT: 0-10

## 2025-06-23 NOTE — PROGRESS NOTES
"Physical Therapy Treatment    Patient Name: Kris Dumont  MRN: [id number]  Time Calculation  Start Time: 0407  Stop Time: 0447  Time Calculation (min): 40 min     PT Therapeutic Procedures Time Entry  Therapeutic Exercise Time Entry: 40                   Insurance:   ANTHEM APPROVED  12  PT  VISITS   6-3-25 THRU  8-31-25  AUTH#  [id number]  [id number]   Visit 7/12     Current Problem  1. Acute pain of right shoulder  Follow Up In Physical Therapy          Subjective   General  Overall her shoulder is making good progress. She is going to complete her therapy and will continue working on the exercises at  home. She will continue her icing regimen and ibuprofen as needed.  We again discussed the phases of frozen shoulder and she understands that she still needs to work through some of these phases at this time.  I will see her back on an as-needed basis or sooner if not progressing. -Encounter note 6/23/25      Pain  0/10    Objective     Treatments:  UBE 2/2 fwd/bwd   Pulley for IR Stretch 5x30\"  Stdg Hands Clasped Behind Back Rivera Ext Stretch 5x30\"  Seated cane flexion 2# 2 x 10   Seated cane abduction 2# 2 x 10   SA punch with flexion 5 x 5 green TB --  ER pullouts 3 x 8 red TB--  SL ER 2# 3x10     SL 3 way jobes 1# 3x10  SA punch 3 x8 1# --  Wall slides + lift 2 x 10 --  Abduction wall walking 2 x 10 --  Bent over I, Y, T 2 x 10 1#--    Manual: all planes GHJ PROM - 8 mins    Assessment:  Pt able to tolerate added reps w/ some exs well this date w/ no adverse effects noted from tx.  Pt compliant w/ therapy and appears to be making gains w/ program  Still requires skilled therapy  to address functional and measurable deficits that impair pt's QOL.     Plan:  D/t noted impairments and dysfunction of  R rivera, PT will cont to address deficits of strength and ROM via therapeutic exs and modalities PRN and further assist w/ pain reduction.          "

## 2025-06-23 NOTE — PROGRESS NOTES
History: Kris is here for her right shoulder.  She is 4 weeks out from manipulation under anesthesia and lysis of adhesions for adhesive capsulitis.  Her shoulder is doing well. She has been doing therapy at home and in-person and is making good progress.     Physical Exam: She get the arm past 120 degrees of abduction and forward flexion with some scapular elevation.  External rotation is at 30 degrees.  Internal rotation is to L5.  She has good strength with fairly mild soreness during stressing.    Radiographs: None today     Assessment: Stable right shoulder status post manipulation under esthesia and lysis of adhesions for adhesive capsulitis, 4 weeks out    Plan: Overall her shoulder is making good progress. She is going to complete her therapy and will continue working on the exercises at  home. She will continue her icing regimen and ibuprofen as needed.  We again discussed the phases of frozen shoulder and she understands that she still needs to work through some of these phases at this time.  I will see her back on an as-needed basis or sooner if not progressing.   All questions were answered with the patient.     This note was generated with voice recognition software and may contain grammatical errors.    Scribe Attestation:  By signing my name below, I, Kait Britton attest that this documentation has been prepared under the direction and in the presence of Saad England MD.    Provider Attestation - Scribe documentation:  All medical record entries made by Tierra Mckinley were at my direction and personally dictated by me, Saad England MD. I have reviewed the chart and agree that the record is accurate and I confirmed that it reflects my personal performance of the history, physical exam, discussion, and plan.

## 2025-06-26 ENCOUNTER — TREATMENT (OUTPATIENT)
Dept: PHYSICAL THERAPY | Facility: CLINIC | Age: 55
End: 2025-06-26
Payer: COMMERCIAL

## 2025-06-26 DIAGNOSIS — M25.511 ACUTE PAIN OF RIGHT SHOULDER: ICD-10-CM

## 2025-06-26 PROCEDURE — 97110 THERAPEUTIC EXERCISES: CPT | Mod: GP

## 2025-06-26 PROCEDURE — 97140 MANUAL THERAPY 1/> REGIONS: CPT | Mod: GP

## 2025-06-26 NOTE — PROGRESS NOTES
Physical Therapy Treatment    Patient Name: Kris Dumont  MRN: [id number]  Time Calculation  Start Time: 1700  Stop Time: 1740  Time Calculation (min): 40 min     PT Therapeutic Procedures Time Entry  Therapeutic Exercise Time Entry: 30  Manual Therapy Time Entry: 10                   Insurance:   ANTHEM APPROVED  12  PT  VISITS   6-3-25 THRU  8-31-25  [facility]#  [id number]  [id number]/ALL   Visit 8/12     Current Problem  1. Acute pain of right shoulder  Follow Up In Physical Therapy          Subjective   General  Pt states that she feels increase in ache today.       Pain  2/10    Objective     Treatments:  UBE 2/2 fwd/bwd    Manual: all planes for R shoulder GHJ    AAROM cane ER 2 x 10   AAROM flexion 2 x 10     Prone I,Y, T 2 x 10  PB wall walking x 20   Snow angels red TB 2 x 10   ER with punch green TB 2 x10   90/90 ER walkouts 2 x 10 red TB  Face pulls red TB 3 x8    90/90 wall slides x 12      Assessment   Pt able to tolerate new exercises with some limited flexion and ER in today's session. She tends to compensate with OH movement by using increased elbow flexion. She feels like she will be ready to be done at her re-evaluation. Pt continues to benefit from skilled PT in order to keep progressing towards their goals.       Plan   Continue current POC, progress as tolerated

## 2025-06-30 ENCOUNTER — TREATMENT (OUTPATIENT)
Dept: PHYSICAL THERAPY | Facility: CLINIC | Age: 55
End: 2025-06-30
Payer: COMMERCIAL

## 2025-06-30 DIAGNOSIS — M25.511 ACUTE PAIN OF RIGHT SHOULDER: ICD-10-CM

## 2025-06-30 PROCEDURE — 97140 MANUAL THERAPY 1/> REGIONS: CPT | Mod: GP

## 2025-06-30 PROCEDURE — 97110 THERAPEUTIC EXERCISES: CPT | Mod: GP

## 2025-06-30 NOTE — PROGRESS NOTES
Physical Therapy Treatment    Patient Name: Kris Dumont  MRN: [id number]  Time Calculation  Start Time: 0743  Stop Time: 0826  Time Calculation (min): 43 min     PT Therapeutic Procedures Time Entry  Therapeutic Exercise Time Entry: 33  Manual Therapy Time Entry: 10                 Insurance:   ANTHEM APPROVED  12  PT  VISITS   6-3-25 THRU  8-31-25  AUTH#  [id number]  [id number]/ALL   Visit 9/12     Current Problem  1. Acute pain of right shoulder  Follow Up In Physical Therapy          Subjective   General  Pt states some increase in soreness today. She thinks that she slept on her arm wrong      Pain  2/10    Objective     Treatments:  UBE 2/2 fwd/bwd     Manual: all planes for R shoulder GHJ    AAROM cane ER 2 x 10   AAROM flexion 2 x 10   SL ER x 25 1#  3 way shoulder x 20 1#  Prone I 1# 2 x10   Prone T x 20  Prone Y x 20   Scap wall push ups x 12  Wall slides + lift flexion x 12   Wall walking abduction x12        Assessment   Pt still very limited with passive ER. Needed some cueing to correct her to do ER AAROM at more of an angle to get an appropriate stretch. Pt continues to benefit from skilled PT in order to keep progressing towards their goals.       Plan   Continue current POC, progress as tolerated

## 2025-07-01 ENCOUNTER — APPOINTMENT (OUTPATIENT)
Dept: PHYSICAL THERAPY | Facility: CLINIC | Age: 55
End: 2025-07-01
Payer: COMMERCIAL

## 2025-07-02 ENCOUNTER — APPOINTMENT (OUTPATIENT)
Dept: PHYSICAL THERAPY | Facility: CLINIC | Age: 55
End: 2025-07-02
Payer: COMMERCIAL

## 2025-07-03 ENCOUNTER — TREATMENT (OUTPATIENT)
Dept: PHYSICAL THERAPY | Facility: CLINIC | Age: 55
End: 2025-07-03
Payer: COMMERCIAL

## 2025-07-03 DIAGNOSIS — M25.511 ACUTE PAIN OF RIGHT SHOULDER: ICD-10-CM

## 2025-07-03 PROCEDURE — 97535 SELF CARE MNGMENT TRAINING: CPT | Mod: GP

## 2025-07-03 NOTE — PROGRESS NOTES
Physical Therapy Recheck     Patient Name: Kris Dumont  MRN: [id number]  Time Calculation  Start Time: 1533  Stop Time: 1548  Time Calculation (min): 15 min     PT Therapeutic Procedures Time Entry  Self-Care/Home Mgmt Training: 15                   Current Problem  1. Acute pain of right shoulder  Follow Up In Physical Therapy      Insurance:   [facility] APPROVED  12  PT  VISITS   6-3-25 THRU  8-31-25  AUTH#  [id number]  [id number]/ALL   Visit 10/12       Subjective   General:  Pt is here for a re-evaluation s/p R shoulder NARAYAN for lysis of adhesion removal for adhesive capsulitis on 5/22/25 with Dr. Oscar England after completing 10 PT visits.       Pain:  0/10  Reviewed medical screening form with pt and medical screening assessed    Imaging:     Objective   Shoulder Musculoskeletal Exam    Range of Motion    Right      Active ROM: no pain.       Passive ROM: no pain.       Forward elevation: 148.       Passive forward elevation: 170.       Right shoulder active abduction: 125.       Right passive external rotation 90 degrees: 65.       Passive internal rotation in abduction: 70.       Internal rotation: lumbar.     Left      Left shoulder range of motion is normal.       Active ROM: no pain.       Internal rotation: mid thoracic.     Range of motion additional comments: R functional ER: to back of neck     Strength    Right      External rotation: 4-/5.       Internal rotation: 5/5.       Abduction: 4-/5.       Biceps: 4-/5.       Triceps: 5/5.     Left      Left shoulder strength is normal.     Strength additional comments: R deltoid: 4-/5       Outcome Measures:  Other Measures  Disability of Arm Shoulder Hand (DASH): 7     Treatment:   Measures for re-eval     EDUCATION/HEP:  Continue current HEP independently to maintain Sx     Assessment:  Pt is here for an evaluation s/p R shoulder NARAYAN for lysis of adhesion removal after adhesive capsulitis with no JAVIER. Pt demonstrates great improvement in range of motion in both  A/PROM from 122 degrees actively to 148 degrees and 170 degrees passively for flexion. Both Passive and active ER/IR most limited, but only lacking 10 degrees with each. Strength is symmetrical and functional compared to contralateral limb. Pt stands to benefit from skilled PT in order to improve R shoulder ROM, RUE strength, stability, flexibility, and endurance in order to PLOF. She will follow up in 1 month for a check in after trialing home HEP.     Clinical Presentation: Stable     Level of Complexity: Low     Goals:  Pt will report at least 75% improvement in R shoulder pain during everyday activities. MET   Pt will demo >/= 4+/5 strength of  shoulder musculature without pain. MET   Pt will demo symmetrical A/PROM of shoulder to 170-180 degrees without pain. MET/PROGRESSING   Pt will improve Quick DASH score by at least 20% (MCID) to indicate a significant improvement in overall function. MET  Pt will demo independence and report compliance with HEP MET     Plan  F/u in 1 month for check in     Skilled therapeutic intervention to address the above mentioned physical and functional impairments and limitations including, but not limited to: patient education, therapeutic exercise, therapeutic activity, manual therapy, body mechanics training, dry needling, blood flow restriction training, instrumented soft tissue mobilization, manual soft tissue mobilization, gait retraining, biofeedback, cryotherapy, electrical stimulation, home program development, hot pack, taping, neuromuscular re-education, self-care/home management, and vasopneumatic compression.

## 2025-08-11 ENCOUNTER — TREATMENT (OUTPATIENT)
Dept: PHYSICAL THERAPY | Facility: CLINIC | Age: 55
End: 2025-08-11
Payer: COMMERCIAL

## 2025-08-11 DIAGNOSIS — M25.511 ACUTE PAIN OF RIGHT SHOULDER: Primary | ICD-10-CM

## 2025-08-11 PROCEDURE — 97535 SELF CARE MNGMENT TRAINING: CPT | Mod: GP
